# Patient Record
Sex: FEMALE | Race: OTHER | NOT HISPANIC OR LATINO
[De-identification: names, ages, dates, MRNs, and addresses within clinical notes are randomized per-mention and may not be internally consistent; named-entity substitution may affect disease eponyms.]

---

## 2015-03-17 RX ORDER — ATORVASTATIN CALCIUM 80 MG/1
1 TABLET, FILM COATED ORAL
Qty: 0 | Refills: 0 | DISCHARGE
Start: 2015-03-17

## 2015-03-17 RX ORDER — METOPROLOL TARTRATE 50 MG
1 TABLET ORAL
Qty: 0 | Refills: 0 | DISCHARGE
Start: 2015-03-17

## 2017-01-09 ENCOUNTER — APPOINTMENT (OUTPATIENT)
Dept: OTOLARYNGOLOGY | Facility: CLINIC | Age: 62
End: 2017-01-09

## 2017-01-09 ENCOUNTER — OUTPATIENT (OUTPATIENT)
Dept: OUTPATIENT SERVICES | Facility: HOSPITAL | Age: 62
LOS: 1 days | Discharge: HOME | End: 2017-01-09

## 2017-01-09 VITALS
DIASTOLIC BLOOD PRESSURE: 82 MMHG | SYSTOLIC BLOOD PRESSURE: 125 MMHG | BODY MASS INDEX: 37.76 KG/M2 | HEIGHT: 61 IN | WEIGHT: 200 LBS | HEART RATE: 62 BPM

## 2017-01-09 DIAGNOSIS — Z98.51 TUBAL LIGATION STATUS: Chronic | ICD-10-CM

## 2017-01-09 DIAGNOSIS — Z90.710 ACQUIRED ABSENCE OF BOTH CERVIX AND UTERUS: Chronic | ICD-10-CM

## 2017-01-09 DIAGNOSIS — R93.0 ABNORMAL FINDINGS ON DIAGNOSTIC IMAGING OF SKULL AND HEAD, NOT ELSEWHERE CLASSIFIED: ICD-10-CM

## 2017-01-09 DIAGNOSIS — Z98.89 OTHER SPECIFIED POSTPROCEDURAL STATES: Chronic | ICD-10-CM

## 2017-01-09 DIAGNOSIS — R93.8 ABNORMAL FINDINGS ON DIAGNOSTIC IMAGING OF OTHER SPECIFIED BODY STRUCTURES: ICD-10-CM

## 2017-01-09 DIAGNOSIS — J32.0 CHRONIC MAXILLARY SINUSITIS: ICD-10-CM

## 2017-01-13 DIAGNOSIS — R93.0 ABNORMAL FINDINGS ON DIAGNOSTIC IMAGING OF SKULL AND HEAD, NOT ELSEWHERE CLASSIFIED: ICD-10-CM

## 2017-01-14 ENCOUNTER — FORM ENCOUNTER (OUTPATIENT)
Age: 62
End: 2017-01-14

## 2017-01-15 ENCOUNTER — OUTPATIENT (OUTPATIENT)
Dept: OUTPATIENT SERVICES | Facility: HOSPITAL | Age: 62
LOS: 1 days | End: 2017-01-15
Payer: MEDICAID

## 2017-01-15 ENCOUNTER — APPOINTMENT (OUTPATIENT)
Dept: CT IMAGING | Facility: IMAGING CENTER | Age: 62
End: 2017-01-15

## 2017-01-15 DIAGNOSIS — Z98.89 OTHER SPECIFIED POSTPROCEDURAL STATES: Chronic | ICD-10-CM

## 2017-01-15 DIAGNOSIS — Z90.710 ACQUIRED ABSENCE OF BOTH CERVIX AND UTERUS: Chronic | ICD-10-CM

## 2017-01-15 DIAGNOSIS — J32.0 CHRONIC MAXILLARY SINUSITIS: ICD-10-CM

## 2017-01-15 DIAGNOSIS — Z98.51 TUBAL LIGATION STATUS: Chronic | ICD-10-CM

## 2017-01-15 PROCEDURE — 70486 CT MAXILLOFACIAL W/O DYE: CPT

## 2017-01-27 ENCOUNTER — APPOINTMENT (OUTPATIENT)
Dept: INTERNAL MEDICINE | Facility: CLINIC | Age: 62
End: 2017-01-27

## 2017-01-27 ENCOUNTER — OUTPATIENT (OUTPATIENT)
Dept: OUTPATIENT SERVICES | Facility: HOSPITAL | Age: 62
LOS: 1 days | End: 2017-01-27
Payer: MEDICAID

## 2017-01-27 VITALS
HEIGHT: 61 IN | HEART RATE: 84 BPM | SYSTOLIC BLOOD PRESSURE: 130 MMHG | WEIGHT: 208 LBS | DIASTOLIC BLOOD PRESSURE: 80 MMHG | BODY MASS INDEX: 39.27 KG/M2

## 2017-01-27 DIAGNOSIS — Z90.710 ACQUIRED ABSENCE OF BOTH CERVIX AND UTERUS: Chronic | ICD-10-CM

## 2017-01-27 DIAGNOSIS — I10 ESSENTIAL (PRIMARY) HYPERTENSION: ICD-10-CM

## 2017-01-27 DIAGNOSIS — Z98.51 TUBAL LIGATION STATUS: Chronic | ICD-10-CM

## 2017-01-27 DIAGNOSIS — Z98.89 OTHER SPECIFIED POSTPROCEDURAL STATES: Chronic | ICD-10-CM

## 2017-01-27 PROCEDURE — G0463: CPT

## 2017-01-30 DIAGNOSIS — R10.9 UNSPECIFIED ABDOMINAL PAIN: ICD-10-CM

## 2017-07-04 ENCOUNTER — RESULT CHARGE (OUTPATIENT)
Age: 62
End: 2017-07-04

## 2017-07-05 ENCOUNTER — NON-APPOINTMENT (OUTPATIENT)
Age: 62
End: 2017-07-05

## 2017-07-05 ENCOUNTER — OUTPATIENT (OUTPATIENT)
Dept: OUTPATIENT SERVICES | Facility: HOSPITAL | Age: 62
LOS: 1 days | End: 2017-07-05
Payer: MEDICAID

## 2017-07-05 ENCOUNTER — APPOINTMENT (OUTPATIENT)
Dept: INTERNAL MEDICINE | Facility: CLINIC | Age: 62
End: 2017-07-05

## 2017-07-05 VITALS
HEART RATE: 69 BPM | WEIGHT: 212 LBS | SYSTOLIC BLOOD PRESSURE: 140 MMHG | HEIGHT: 61 IN | OXYGEN SATURATION: 98 % | DIASTOLIC BLOOD PRESSURE: 78 MMHG | BODY MASS INDEX: 40.02 KG/M2

## 2017-07-05 VITALS — DIASTOLIC BLOOD PRESSURE: 84 MMHG | SYSTOLIC BLOOD PRESSURE: 130 MMHG

## 2017-07-05 DIAGNOSIS — Z80.41 FAMILY HISTORY OF MALIGNANT NEOPLASM OF OVARY: ICD-10-CM

## 2017-07-05 DIAGNOSIS — R11.0 NAUSEA: ICD-10-CM

## 2017-07-05 DIAGNOSIS — Z98.51 TUBAL LIGATION STATUS: Chronic | ICD-10-CM

## 2017-07-05 DIAGNOSIS — Z12.11 ENCOUNTER FOR SCREENING FOR MALIGNANT NEOPLASM OF COLON: ICD-10-CM

## 2017-07-05 DIAGNOSIS — Z90.710 ACQUIRED ABSENCE OF BOTH CERVIX AND UTERUS: Chronic | ICD-10-CM

## 2017-07-05 DIAGNOSIS — Z87.898 PERSONAL HISTORY OF OTHER SPECIFIED CONDITIONS: ICD-10-CM

## 2017-07-05 DIAGNOSIS — Z87.19 PERSONAL HISTORY OF OTHER DISEASES OF THE DIGESTIVE SYSTEM: ICD-10-CM

## 2017-07-05 DIAGNOSIS — I10 ESSENTIAL (PRIMARY) HYPERTENSION: ICD-10-CM

## 2017-07-05 DIAGNOSIS — Z98.89 OTHER SPECIFIED POSTPROCEDURAL STATES: Chronic | ICD-10-CM

## 2017-07-05 PROCEDURE — G0463: CPT

## 2017-07-05 PROCEDURE — 93005 ELECTROCARDIOGRAM TRACING: CPT

## 2017-07-06 LAB
ALBUMIN SERPL ELPH-MCNC: 4.4 G/DL
ALP BLD-CCNC: 84 U/L
ALT SERPL-CCNC: 18 U/L
ANION GAP SERPL CALC-SCNC: 16 MMOL/L
AST SERPL-CCNC: 26 U/L
BASOPHILS # BLD AUTO: 0.01 K/UL
BASOPHILS NFR BLD AUTO: 0.2 %
BILIRUB SERPL-MCNC: 0.4 MG/DL
BUN SERPL-MCNC: 15 MG/DL
CALCIUM SERPL-MCNC: 9.5 MG/DL
CHLORIDE SERPL-SCNC: 104 MMOL/L
CHOLEST SERPL-MCNC: 259 MG/DL
CHOLEST/HDLC SERPL: 4.2 RATIO
CO2 SERPL-SCNC: 23 MMOL/L
CREAT SERPL-MCNC: 0.92 MG/DL
EOSINOPHIL # BLD AUTO: 0.17 K/UL
EOSINOPHIL NFR BLD AUTO: 3.5 %
GLUCOSE SERPL-MCNC: 98 MG/DL
HBA1C MFR BLD HPLC: 5.8 %
HCT VFR BLD CALC: 41.8 %
HDLC SERPL-MCNC: 61 MG/DL
HGB BLD-MCNC: 13.3 G/DL
IMM GRANULOCYTES NFR BLD AUTO: 0.4 %
LDLC SERPL CALC-MCNC: 165 MG/DL
LYMPHOCYTES # BLD AUTO: 2.03 K/UL
LYMPHOCYTES NFR BLD AUTO: 41.5 %
MAN DIFF?: NORMAL
MCHC RBC-ENTMCNC: 28.7 PG
MCHC RBC-ENTMCNC: 31.8 GM/DL
MCV RBC AUTO: 90.3 FL
MONOCYTES # BLD AUTO: 0.39 K/UL
MONOCYTES NFR BLD AUTO: 8 %
NEUTROPHILS # BLD AUTO: 2.27 K/UL
NEUTROPHILS NFR BLD AUTO: 46.4 %
PLATELET # BLD AUTO: 252 K/UL
POTASSIUM SERPL-SCNC: 5.4 MMOL/L
PROT SERPL-MCNC: 7.9 G/DL
RBC # BLD: 4.63 M/UL
RBC # FLD: 13.3 %
SODIUM SERPL-SCNC: 143 MMOL/L
TRIGL SERPL-MCNC: 165 MG/DL
TSH SERPL-ACNC: 1.5 UIU/ML
WBC # FLD AUTO: 4.89 K/UL

## 2017-07-07 DIAGNOSIS — E66.9 OBESITY, UNSPECIFIED: ICD-10-CM

## 2017-07-07 DIAGNOSIS — E78.5 HYPERLIPIDEMIA, UNSPECIFIED: ICD-10-CM

## 2017-07-07 DIAGNOSIS — Z80.41 FAMILY HISTORY OF MALIGNANT NEOPLASM OF OVARY: ICD-10-CM

## 2017-07-07 DIAGNOSIS — R11.0 NAUSEA: ICD-10-CM

## 2017-07-07 DIAGNOSIS — K21.9 GASTRO-ESOPHAGEAL REFLUX DISEASE WITHOUT ESOPHAGITIS: ICD-10-CM

## 2017-09-13 ENCOUNTER — OUTPATIENT (OUTPATIENT)
Dept: OUTPATIENT SERVICES | Facility: HOSPITAL | Age: 62
LOS: 1 days | End: 2017-09-13
Payer: MEDICAID

## 2017-09-13 ENCOUNTER — APPOINTMENT (OUTPATIENT)
Dept: INTERNAL MEDICINE | Facility: CLINIC | Age: 62
End: 2017-09-13
Payer: MEDICAID

## 2017-09-13 VITALS
SYSTOLIC BLOOD PRESSURE: 110 MMHG | DIASTOLIC BLOOD PRESSURE: 70 MMHG | HEIGHT: 61 IN | WEIGHT: 208 LBS | BODY MASS INDEX: 39.27 KG/M2

## 2017-09-13 DIAGNOSIS — Z98.89 OTHER SPECIFIED POSTPROCEDURAL STATES: Chronic | ICD-10-CM

## 2017-09-13 DIAGNOSIS — Z90.710 ACQUIRED ABSENCE OF BOTH CERVIX AND UTERUS: Chronic | ICD-10-CM

## 2017-09-13 DIAGNOSIS — Z98.51 TUBAL LIGATION STATUS: Chronic | ICD-10-CM

## 2017-09-13 DIAGNOSIS — I10 ESSENTIAL (PRIMARY) HYPERTENSION: ICD-10-CM

## 2017-09-13 PROCEDURE — 90736 HZV VACCINE LIVE SUBQ: CPT

## 2017-09-13 PROCEDURE — 90471 IMMUNIZATION ADMIN: CPT

## 2017-09-13 PROCEDURE — G0463: CPT

## 2017-09-13 PROCEDURE — 99213 OFFICE O/P EST LOW 20 MIN: CPT | Mod: GE

## 2017-09-25 DIAGNOSIS — K21.9 GASTRO-ESOPHAGEAL REFLUX DISEASE WITHOUT ESOPHAGITIS: ICD-10-CM

## 2017-09-25 DIAGNOSIS — Z23 ENCOUNTER FOR IMMUNIZATION: ICD-10-CM

## 2017-09-25 DIAGNOSIS — E78.5 HYPERLIPIDEMIA, UNSPECIFIED: ICD-10-CM

## 2017-09-25 DIAGNOSIS — E66.9 OBESITY, UNSPECIFIED: ICD-10-CM

## 2017-09-25 DIAGNOSIS — Z86.39 PERSONAL HISTORY OF OTHER ENDOCRINE, NUTRITIONAL AND METABOLIC DISEASE: ICD-10-CM

## 2017-10-11 LAB
ANION GAP SERPL CALC-SCNC: 15 MMOL/L
BUN SERPL-MCNC: 20 MG/DL
CALCIUM SERPL-MCNC: 10.4 MG/DL
CHLORIDE SERPL-SCNC: 104 MMOL/L
CO2 SERPL-SCNC: 23 MMOL/L
CREAT SERPL-MCNC: 0.95 MG/DL
GLUCOSE SERPL-MCNC: 101 MG/DL
POTASSIUM SERPL-SCNC: 5 MMOL/L
SODIUM SERPL-SCNC: 142 MMOL/L

## 2017-12-24 ENCOUNTER — OTHER (OUTPATIENT)
Age: 62
End: 2017-12-24

## 2017-12-27 ENCOUNTER — APPOINTMENT (OUTPATIENT)
Dept: INTERNAL MEDICINE | Facility: CLINIC | Age: 62
End: 2017-12-27
Payer: MEDICAID

## 2017-12-27 ENCOUNTER — OUTPATIENT (OUTPATIENT)
Dept: OUTPATIENT SERVICES | Facility: HOSPITAL | Age: 62
LOS: 1 days | End: 2017-12-27
Payer: MEDICAID

## 2017-12-27 ENCOUNTER — NON-APPOINTMENT (OUTPATIENT)
Age: 62
End: 2017-12-27

## 2017-12-27 VITALS — TEMPERATURE: 97.6 F

## 2017-12-27 VITALS — SYSTOLIC BLOOD PRESSURE: 125 MMHG | OXYGEN SATURATION: 98 % | DIASTOLIC BLOOD PRESSURE: 80 MMHG | HEART RATE: 74 BPM

## 2017-12-27 VITALS — WEIGHT: 217 LBS | BODY MASS INDEX: 41 KG/M2

## 2017-12-27 DIAGNOSIS — I10 ESSENTIAL (PRIMARY) HYPERTENSION: ICD-10-CM

## 2017-12-27 DIAGNOSIS — Z90.710 ACQUIRED ABSENCE OF BOTH CERVIX AND UTERUS: Chronic | ICD-10-CM

## 2017-12-27 DIAGNOSIS — Z98.89 OTHER SPECIFIED POSTPROCEDURAL STATES: Chronic | ICD-10-CM

## 2017-12-27 DIAGNOSIS — Z86.39 PERSONAL HISTORY OF OTHER ENDOCRINE, NUTRITIONAL AND METABOLIC DISEASE: ICD-10-CM

## 2017-12-27 DIAGNOSIS — Z98.51 TUBAL LIGATION STATUS: Chronic | ICD-10-CM

## 2017-12-27 LAB — HBA1C MFR BLD HPLC: 5.8 %

## 2017-12-27 PROCEDURE — 99213 OFFICE O/P EST LOW 20 MIN: CPT | Mod: GE

## 2017-12-27 PROCEDURE — 90715 TDAP VACCINE 7 YRS/> IM: CPT

## 2017-12-27 PROCEDURE — G0463: CPT

## 2017-12-27 PROCEDURE — 93005 ELECTROCARDIOGRAM TRACING: CPT

## 2017-12-27 PROCEDURE — 90471 IMMUNIZATION ADMIN: CPT

## 2017-12-27 RX ORDER — FAMOTIDINE 20 MG/1
20 TABLET, FILM COATED ORAL DAILY
Qty: 30 | Refills: 1 | Status: DISCONTINUED | COMMUNITY
Start: 2017-07-05 | End: 2017-12-27

## 2017-12-29 LAB
ANION GAP SERPL CALC-SCNC: 14 MMOL/L
BUN SERPL-MCNC: 17 MG/DL
CALCIUM SERPL-MCNC: 9.8 MG/DL
CHLORIDE SERPL-SCNC: 104 MMOL/L
CO2 SERPL-SCNC: 24 MMOL/L
CREAT SERPL-MCNC: 0.9 MG/DL
GLUCOSE SERPL-MCNC: 90 MG/DL
POTASSIUM SERPL-SCNC: 4.5 MMOL/L
SODIUM SERPL-SCNC: 142 MMOL/L
TSH SERPL-ACNC: 1.66 UIU/ML

## 2018-01-02 DIAGNOSIS — K21.9 GASTRO-ESOPHAGEAL REFLUX DISEASE WITHOUT ESOPHAGITIS: ICD-10-CM

## 2018-01-02 DIAGNOSIS — E78.5 HYPERLIPIDEMIA, UNSPECIFIED: ICD-10-CM

## 2018-01-02 DIAGNOSIS — Z86.39 PERSONAL HISTORY OF OTHER ENDOCRINE, NUTRITIONAL AND METABOLIC DISEASE: ICD-10-CM

## 2018-01-02 DIAGNOSIS — Z23 ENCOUNTER FOR IMMUNIZATION: ICD-10-CM

## 2018-01-02 DIAGNOSIS — E66.9 OBESITY, UNSPECIFIED: ICD-10-CM

## 2018-01-02 DIAGNOSIS — R51 HEADACHE: ICD-10-CM

## 2018-01-02 DIAGNOSIS — R06.02 SHORTNESS OF BREATH: ICD-10-CM

## 2018-01-02 DIAGNOSIS — Z80.41 FAMILY HISTORY OF MALIGNANT NEOPLASM OF OVARY: ICD-10-CM

## 2018-01-04 ENCOUNTER — NON-APPOINTMENT (OUTPATIENT)
Age: 63
End: 2018-01-04

## 2018-02-01 ENCOUNTER — OUTPATIENT (OUTPATIENT)
Dept: OUTPATIENT SERVICES | Facility: HOSPITAL | Age: 63
LOS: 1 days | End: 2018-02-01
Payer: MEDICAID

## 2018-02-01 DIAGNOSIS — Z98.51 TUBAL LIGATION STATUS: Chronic | ICD-10-CM

## 2018-02-01 DIAGNOSIS — Z90.710 ACQUIRED ABSENCE OF BOTH CERVIX AND UTERUS: Chronic | ICD-10-CM

## 2018-02-01 DIAGNOSIS — Z98.89 OTHER SPECIFIED POSTPROCEDURAL STATES: Chronic | ICD-10-CM

## 2018-02-01 PROCEDURE — G9001: CPT

## 2018-02-12 ENCOUNTER — EMERGENCY (EMERGENCY)
Facility: HOSPITAL | Age: 63
LOS: 1 days | Discharge: HOME | End: 2018-02-12
Attending: EMERGENCY MEDICINE
Payer: MEDICAID

## 2018-02-12 VITALS
WEIGHT: 212.08 LBS | SYSTOLIC BLOOD PRESSURE: 132 MMHG | HEART RATE: 105 BPM | HEIGHT: 61 IN | RESPIRATION RATE: 20 BRPM | OXYGEN SATURATION: 96 % | DIASTOLIC BLOOD PRESSURE: 89 MMHG | TEMPERATURE: 100 F

## 2018-02-12 VITALS
OXYGEN SATURATION: 97 % | TEMPERATURE: 98 F | SYSTOLIC BLOOD PRESSURE: 125 MMHG | DIASTOLIC BLOOD PRESSURE: 86 MMHG | RESPIRATION RATE: 16 BRPM | HEART RATE: 68 BPM

## 2018-02-12 DIAGNOSIS — Z98.51 TUBAL LIGATION STATUS: Chronic | ICD-10-CM

## 2018-02-12 DIAGNOSIS — Z90.710 ACQUIRED ABSENCE OF BOTH CERVIX AND UTERUS: Chronic | ICD-10-CM

## 2018-02-12 DIAGNOSIS — Z98.89 OTHER SPECIFIED POSTPROCEDURAL STATES: Chronic | ICD-10-CM

## 2018-02-12 LAB
ALBUMIN SERPL ELPH-MCNC: 3.8 G/DL — SIGNIFICANT CHANGE UP (ref 3.3–5)
ALP SERPL-CCNC: 80 U/L — SIGNIFICANT CHANGE UP (ref 40–120)
ALT FLD-CCNC: 25 U/L RC — SIGNIFICANT CHANGE UP (ref 10–45)
ANION GAP SERPL CALC-SCNC: 15 MMOL/L — SIGNIFICANT CHANGE UP (ref 5–17)
AST SERPL-CCNC: 34 U/L — SIGNIFICANT CHANGE UP (ref 10–40)
BASOPHILS # BLD AUTO: 0 K/UL — SIGNIFICANT CHANGE UP (ref 0–0.2)
BASOPHILS NFR BLD AUTO: 0.3 % — SIGNIFICANT CHANGE UP (ref 0–2)
BILIRUB SERPL-MCNC: 0.2 MG/DL — SIGNIFICANT CHANGE UP (ref 0.2–1.2)
BUN SERPL-MCNC: 11 MG/DL — SIGNIFICANT CHANGE UP (ref 7–23)
CALCIUM SERPL-MCNC: 9 MG/DL — SIGNIFICANT CHANGE UP (ref 8.4–10.5)
CHLORIDE SERPL-SCNC: 103 MMOL/L — SIGNIFICANT CHANGE UP (ref 96–108)
CO2 SERPL-SCNC: 22 MMOL/L — SIGNIFICANT CHANGE UP (ref 22–31)
CREAT SERPL-MCNC: 0.99 MG/DL — SIGNIFICANT CHANGE UP (ref 0.5–1.3)
EOSINOPHIL # BLD AUTO: 0 K/UL — SIGNIFICANT CHANGE UP (ref 0–0.5)
EOSINOPHIL NFR BLD AUTO: 0.4 % — SIGNIFICANT CHANGE UP (ref 0–6)
FLUAV H1 2009 PAND RNA SPEC QL NAA+PROBE: DETECTED
GLUCOSE SERPL-MCNC: 146 MG/DL — HIGH (ref 70–99)
HCT VFR BLD CALC: 43.8 % — SIGNIFICANT CHANGE UP (ref 34.5–45)
HGB BLD-MCNC: 14.4 G/DL — SIGNIFICANT CHANGE UP (ref 11.5–15.5)
LYMPHOCYTES # BLD AUTO: 1.5 K/UL — SIGNIFICANT CHANGE UP (ref 1–3.3)
LYMPHOCYTES # BLD AUTO: 22.3 % — SIGNIFICANT CHANGE UP (ref 13–44)
MCHC RBC-ENTMCNC: 29.7 PG — SIGNIFICANT CHANGE UP (ref 27–34)
MCHC RBC-ENTMCNC: 32.8 GM/DL — SIGNIFICANT CHANGE UP (ref 32–36)
MCV RBC AUTO: 90.7 FL — SIGNIFICANT CHANGE UP (ref 80–100)
MONOCYTES # BLD AUTO: 0.6 K/UL — SIGNIFICANT CHANGE UP (ref 0–0.9)
MONOCYTES NFR BLD AUTO: 9.7 % — SIGNIFICANT CHANGE UP (ref 2–14)
NEUTROPHILS # BLD AUTO: 4.5 K/UL — SIGNIFICANT CHANGE UP (ref 1.8–7.4)
NEUTROPHILS NFR BLD AUTO: 67.3 % — SIGNIFICANT CHANGE UP (ref 43–77)
PLATELET # BLD AUTO: 178 K/UL — SIGNIFICANT CHANGE UP (ref 150–400)
POTASSIUM SERPL-MCNC: 3.8 MMOL/L — SIGNIFICANT CHANGE UP (ref 3.5–5.3)
POTASSIUM SERPL-SCNC: 3.8 MMOL/L — SIGNIFICANT CHANGE UP (ref 3.5–5.3)
PROT SERPL-MCNC: 7.8 G/DL — SIGNIFICANT CHANGE UP (ref 6–8.3)
RAPID RVP RESULT: DETECTED
RBC # BLD: 4.83 M/UL — SIGNIFICANT CHANGE UP (ref 3.8–5.2)
RBC # FLD: 11.7 % — SIGNIFICANT CHANGE UP (ref 10.3–14.5)
SODIUM SERPL-SCNC: 140 MMOL/L — SIGNIFICANT CHANGE UP (ref 135–145)
WBC # BLD: 6.6 K/UL — SIGNIFICANT CHANGE UP (ref 3.8–10.5)
WBC # FLD AUTO: 6.6 K/UL — SIGNIFICANT CHANGE UP (ref 3.8–10.5)

## 2018-02-12 PROCEDURE — 99284 EMERGENCY DEPT VISIT MOD MDM: CPT | Mod: 25

## 2018-02-12 PROCEDURE — 87581 M.PNEUMON DNA AMP PROBE: CPT

## 2018-02-12 PROCEDURE — 80053 COMPREHEN METABOLIC PANEL: CPT

## 2018-02-12 PROCEDURE — 85027 COMPLETE CBC AUTOMATED: CPT

## 2018-02-12 PROCEDURE — 87633 RESP VIRUS 12-25 TARGETS: CPT

## 2018-02-12 PROCEDURE — 71046 X-RAY EXAM CHEST 2 VIEWS: CPT

## 2018-02-12 PROCEDURE — 87798 DETECT AGENT NOS DNA AMP: CPT

## 2018-02-12 PROCEDURE — 71046 X-RAY EXAM CHEST 2 VIEWS: CPT | Mod: 26

## 2018-02-12 PROCEDURE — 96374 THER/PROPH/DIAG INJ IV PUSH: CPT

## 2018-02-12 PROCEDURE — 87486 CHLMYD PNEUM DNA AMP PROBE: CPT

## 2018-02-12 PROCEDURE — 99284 EMERGENCY DEPT VISIT MOD MDM: CPT

## 2018-02-12 RX ORDER — ACETAMINOPHEN 500 MG
1000 TABLET ORAL ONCE
Qty: 0 | Refills: 0 | Status: COMPLETED | OUTPATIENT
Start: 2018-02-12 | End: 2018-02-12

## 2018-02-12 RX ORDER — SODIUM CHLORIDE 9 MG/ML
1000 INJECTION INTRAMUSCULAR; INTRAVENOUS; SUBCUTANEOUS ONCE
Qty: 0 | Refills: 0 | Status: COMPLETED | OUTPATIENT
Start: 2018-02-12 | End: 2018-02-12

## 2018-02-12 RX ADMIN — Medication 400 MILLIGRAM(S): at 15:41

## 2018-02-12 RX ADMIN — Medication 75 MILLIGRAM(S): at 17:40

## 2018-02-12 RX ADMIN — SODIUM CHLORIDE 2000 MILLILITER(S): 9 INJECTION INTRAMUSCULAR; INTRAVENOUS; SUBCUTANEOUS at 15:41

## 2018-02-12 NOTE — ED PROVIDER NOTE - MEDICAL DECISION MAKING DETAILS
****ATTENDING**** 63yo f hx listed pw cough and fever x 3 days. Pts VS stable. Check Labs, RVP, Xray chest. Likely viral syndrome. IVF and antipyretics and re eval.

## 2018-02-12 NOTE — ED PROVIDER NOTE - PLAN OF CARE
1. Follow up with PMD or [facility] at [phone] in 24-48hrs.  2. Hydrate with plenty of fluids.  3. Tylenol or Motrin as directed for fever.  4. If patient develops worsening symptoms, develops chest pain, shortness of breath, or any other concern return to the ER. Please follow up with your Primary MD in 24-48 hr.  Tamiflu 75mg 1 tab by mouth twice a day.  Continue hydration with fluids.  For fever, tylenol or ibuprofen as directed.  Seek immediate medical care for any new/worsening signs or symptoms including chest pain, shortness of breath, persistent fever or any other concern.

## 2018-02-12 NOTE — ED PROVIDER NOTE - OBJECTIVE STATEMENT
61 yo f with pmhx of hdl presents with cough, congestion, and fever that started Saturday afternoon. Her fever increased from 99.9 on Saturday to 101.5 today. Pt also reports some sob, but denies chest pain, abdominal pain, diarrhea. No sick contacts or recent travel. 63 yo f with pmhx of HLD presents with cough, congestion, and fever that started Saturday afternoon. cough is non productive. Her fever increased from 99.9 on Saturday to 101.5 today. Pt denies chest pain, SOB, abdominal pain, diarrhea. No sick contacts or recent travel.

## 2018-02-12 NOTE — ED PROVIDER NOTE - PROGRESS NOTE DETAILS
Patient is reassessed, states feeling much better at this time. Discussed results and return precautions. [id number]

## 2018-02-12 NOTE — ED ADULT NURSE NOTE - OBJECTIVE STATEMENT
63 y/o female no PMH presents to the ED c/o cough fever tmax 100F x 2 days. Pt presents to ED states she "thinks I have the flu," body aches, chills. Denies productive cough, n/v, diarrhea, constipation. Pt A&Ox3, in no respiratory distress, no CP, lungs CTA b/l, no accessory muscle use. Pt safety maintained with daughter at bedside.

## 2018-02-12 NOTE — ED PROVIDER NOTE - CARE PLAN
Assessment and plan of treatment:	1. Follow up with PMD or [facility] at [phone] in 24-48hrs.  2. Hydrate with plenty of fluids.  3. Tylenol or Motrin as directed for fever.  4. If patient develops worsening symptoms, develops chest pain, shortness of breath, or any other concern return to the ER. Principal Discharge DX:	Influenza A  Assessment and plan of treatment:	Please follow up with your Primary MD in 24-48 hr.  Tamiflu 75mg 1 tab by mouth twice a day.  Continue hydration with fluids.  For fever, tylenol or ibuprofen as directed.  Seek immediate medical care for any new/worsening signs or symptoms including chest pain, shortness of breath, persistent fever or any other concern.

## 2018-02-15 DIAGNOSIS — R69 ILLNESS, UNSPECIFIED: ICD-10-CM

## 2018-03-21 ENCOUNTER — MESSAGE (OUTPATIENT)
Age: 63
End: 2018-03-21

## 2018-04-08 ENCOUNTER — FORM ENCOUNTER (OUTPATIENT)
Age: 63
End: 2018-04-08

## 2018-04-09 ENCOUNTER — OUTPATIENT (OUTPATIENT)
Dept: OUTPATIENT SERVICES | Facility: HOSPITAL | Age: 63
LOS: 1 days | End: 2018-04-09
Payer: MEDICAID

## 2018-04-09 ENCOUNTER — APPOINTMENT (OUTPATIENT)
Dept: INTERNAL MEDICINE | Facility: CLINIC | Age: 63
End: 2018-04-09
Payer: MEDICAID

## 2018-04-09 ENCOUNTER — APPOINTMENT (OUTPATIENT)
Dept: MAMMOGRAPHY | Facility: IMAGING CENTER | Age: 63
End: 2018-04-09
Payer: MEDICAID

## 2018-04-09 VITALS
DIASTOLIC BLOOD PRESSURE: 80 MMHG | SYSTOLIC BLOOD PRESSURE: 119 MMHG | BODY MASS INDEX: 39.84 KG/M2 | HEIGHT: 61 IN | WEIGHT: 211 LBS

## 2018-04-09 DIAGNOSIS — K21.9 GASTRO-ESOPHAGEAL REFLUX DISEASE WITHOUT ESOPHAGITIS: ICD-10-CM

## 2018-04-09 DIAGNOSIS — R93.0 ABNORMAL FINDINGS ON DIAGNOSTIC IMAGING OF SKULL AND HEAD, NOT ELSEWHERE CLASSIFIED: ICD-10-CM

## 2018-04-09 DIAGNOSIS — Z86.39 PERSONAL HISTORY OF OTHER ENDOCRINE, NUTRITIONAL AND METABOLIC DISEASE: ICD-10-CM

## 2018-04-09 DIAGNOSIS — E78.5 HYPERLIPIDEMIA, UNSPECIFIED: ICD-10-CM

## 2018-04-09 DIAGNOSIS — R11.0 NAUSEA: ICD-10-CM

## 2018-04-09 DIAGNOSIS — Z98.51 TUBAL LIGATION STATUS: Chronic | ICD-10-CM

## 2018-04-09 DIAGNOSIS — Z98.89 OTHER SPECIFIED POSTPROCEDURAL STATES: Chronic | ICD-10-CM

## 2018-04-09 DIAGNOSIS — Z90.710 ACQUIRED ABSENCE OF BOTH CERVIX AND UTERUS: Chronic | ICD-10-CM

## 2018-04-09 DIAGNOSIS — Z00.8 ENCOUNTER FOR OTHER GENERAL EXAMINATION: ICD-10-CM

## 2018-04-09 DIAGNOSIS — R51 HEADACHE: ICD-10-CM

## 2018-04-09 DIAGNOSIS — R06.02 SHORTNESS OF BREATH: ICD-10-CM

## 2018-04-09 DIAGNOSIS — R93.8 ABNORMAL FINDINGS ON DIAGNOSTIC IMAGING OF OTHER SPECIFIED BODY STRUCTURES: ICD-10-CM

## 2018-04-09 DIAGNOSIS — I10 ESSENTIAL (PRIMARY) HYPERTENSION: ICD-10-CM

## 2018-04-09 DIAGNOSIS — E66.9 OBESITY, UNSPECIFIED: ICD-10-CM

## 2018-04-09 DIAGNOSIS — J32.0 CHRONIC MAXILLARY SINUSITIS: ICD-10-CM

## 2018-04-09 PROCEDURE — 99213 OFFICE O/P EST LOW 20 MIN: CPT | Mod: GE

## 2018-04-09 PROCEDURE — G0463: CPT

## 2018-04-09 PROCEDURE — 77063 BREAST TOMOSYNTHESIS BI: CPT

## 2018-04-09 PROCEDURE — 77067 SCR MAMMO BI INCL CAD: CPT

## 2018-04-09 PROCEDURE — 77067 SCR MAMMO BI INCL CAD: CPT | Mod: 26

## 2018-04-09 PROCEDURE — 77063 BREAST TOMOSYNTHESIS BI: CPT | Mod: 26

## 2018-04-09 RX ORDER — FLUTICASONE PROPIONATE 50 UG/1
50 SPRAY, METERED NASAL TWICE DAILY
Qty: 1 | Refills: 3 | Status: DISCONTINUED | COMMUNITY
Start: 2017-12-27 | End: 2018-04-09

## 2018-04-09 RX ORDER — BUTALBITAL, ACETAMINOPHEN, AND CAFFEINE 50; 300; 40 MG/1; MG/1; MG/1
50-300-40 CAPSULE ORAL EVERY 6 HOURS
Qty: 1 | Refills: 0 | Status: DISCONTINUED | COMMUNITY
Start: 2017-12-27 | End: 2018-04-09

## 2018-04-13 ENCOUNTER — APPOINTMENT (OUTPATIENT)
Dept: INTERNAL MEDICINE | Facility: CLINIC | Age: 63
End: 2018-04-13

## 2018-06-12 ENCOUNTER — APPOINTMENT (OUTPATIENT)
Dept: INTERNAL MEDICINE | Facility: CLINIC | Age: 63
End: 2018-06-12
Payer: MEDICAID

## 2018-06-12 ENCOUNTER — OUTPATIENT (OUTPATIENT)
Dept: OUTPATIENT SERVICES | Facility: HOSPITAL | Age: 63
LOS: 1 days | End: 2018-06-12
Payer: MEDICAID

## 2018-06-12 VITALS
BODY MASS INDEX: 40.78 KG/M2 | HEIGHT: 61 IN | SYSTOLIC BLOOD PRESSURE: 130 MMHG | WEIGHT: 216 LBS | DIASTOLIC BLOOD PRESSURE: 90 MMHG

## 2018-06-12 DIAGNOSIS — Z90.710 ACQUIRED ABSENCE OF BOTH CERVIX AND UTERUS: Chronic | ICD-10-CM

## 2018-06-12 DIAGNOSIS — Z98.89 OTHER SPECIFIED POSTPROCEDURAL STATES: Chronic | ICD-10-CM

## 2018-06-12 DIAGNOSIS — I10 ESSENTIAL (PRIMARY) HYPERTENSION: ICD-10-CM

## 2018-06-12 DIAGNOSIS — Z98.51 TUBAL LIGATION STATUS: Chronic | ICD-10-CM

## 2018-06-12 PROCEDURE — G0463: CPT

## 2018-06-12 PROCEDURE — 99213 OFFICE O/P EST LOW 20 MIN: CPT | Mod: GE

## 2018-06-14 NOTE — ASSESSMENT
[FreeTextEntry1] : 61 yo Holiness F with a PMH GERD, HLD, and chronic sinusitis who presents for f/u appt, c/o back pain.\par \par 1) Back pain\par - pt presenting again with back pain, failed conservative management with motrin. Pt unable to lose weight as pain is too severe. Will give pt naproxen and flexeril as needed as well as physical therapy. Pt advised to RTC in 5 weeks. Will consider MRI and ortho consult if pain not improved at that time.\par \par case d/w  Dr. Sykes

## 2018-06-14 NOTE — HISTORY OF PRESENT ILLNESS
[de-identified] : 63 yo Quaker F with a PMH GERD, HLD, and chronic sinusitis who presents for f/u appt for back pain.\par \par Back pain- pt last seen in April for back pain. Pt states has been persistent since then and last two days has worsened and now is unbearable. Pain mostly on lower left side with radiation down left leg. Denies weakness or numbness/tingling. No trauma or surgeries. Originally started in April after bought of the flu. Pt states motrin helps a little but then comes back. Worse with standing and walking. Able to lie down in certain positions. Last visit pt recommended weight loss, but pain is limiting her physical activity.

## 2018-06-14 NOTE — PHYSICAL EXAM
[No Acute Distress] : no acute distress [Well Nourished] : well nourished [Normal Sclera/Conjunctiva] : normal sclera/conjunctiva [PERRL] : pupils equal round and reactive to light [EOMI] : extraocular movements intact [Normal Outer Ear/Nose] : the outer ears and nose were normal in appearance [Normal Oropharynx] : the oropharynx was normal [No JVD] : no jugular venous distention [Supple] : supple [No Respiratory Distress] : no respiratory distress  [Clear to Auscultation] : lungs were clear to auscultation bilaterally [Normal Rate] : normal rate  [Regular Rhythm] : with a regular rhythm [Normal S1, S2] : normal S1 and S2 [No Murmur] : no murmur heard [No Edema] : there was no peripheral edema [Soft] : abdomen soft [Non Tender] : non-tender [No HSM] : no HSM [Normal Bowel Sounds] : normal bowel sounds [Normal Supraclavicular Nodes] : no supraclavicular lymphadenopathy [Normal Posterior Cervical Nodes] : no posterior cervical lymphadenopathy [Normal Anterior Cervical Nodes] : no anterior cervical lymphadenopathy [No CVA Tenderness] : no CVA  tenderness [No Joint Swelling] : no joint swelling [Grossly Normal Strength/Tone] : grossly normal strength/tone [No Rash] : no rash [No Skin Lesions] : no skin lesions [Normal Gait] : normal gait [Coordination Grossly Intact] : coordination grossly intact [No Focal Deficits] : no focal deficits [Alert and Oriented x3] : oriented to person, place, and time [Normal Mood] : the mood was normal [Normal Insight/Judgement] : insight and judgment were intact [de-identified] : obese [de-identified] : L paraspinal tenderness and spasm at L3-4, minimal spinal tenderness. Straight leg raise test negative.  [de-identified] : strenght 5/5 all extremiteis [de-identified] : sensation grossly normal

## 2018-06-14 NOTE — REVIEW OF SYSTEMS
[Heartburn] : heartburn [Back Pain] : back pain [Fever] : no fever [Chills] : no chills [Fatigue] : no fatigue [Night Sweats] : no night sweats [Pain] : no pain [Vision Problems] : no vision problems [Earache] : no earache [Hearing Loss] : no hearing loss [Sore Throat] : no sore throat [Chest Pain] : no chest pain [Palpitations] : no palpitations [Lower Ext Edema] : no lower extremity edema [Orthopnea] : no orthopnea [Shortness Of Breath] : no shortness of breath [Wheezing] : no wheezing [Cough] : no cough [Dyspnea on Exertion] : no dyspnea on exertion [Abdominal Pain] : no abdominal pain [Nausea] : no nausea [Constipation] : no constipation [Diarrhea] : diarrhea [Vomiting] : no vomiting [Dysuria] : no dysuria [Incontinence] : no incontinence [Hematuria] : no hematuria [Joint Pain] : no joint pain [Muscle Pain] : no muscle pain [Itching] : no itching [Skin Rash] : no skin rash [Headache] : no headache [Dizziness] : no dizziness [Anxiety] : no anxiety [Depression] : no depression [Easy Bleeding] : no easy bleeding [Swollen Glands] : no swollen glands

## 2018-06-19 DIAGNOSIS — M54.9 DORSALGIA, UNSPECIFIED: ICD-10-CM

## 2019-07-17 PROBLEM — E78.00 PURE HYPERCHOLESTEROLEMIA, UNSPECIFIED: Chronic | Status: ACTIVE | Noted: 2018-02-12

## 2019-08-21 ENCOUNTER — APPOINTMENT (OUTPATIENT)
Dept: INTERNAL MEDICINE | Facility: CLINIC | Age: 64
End: 2019-08-21

## 2019-10-23 ENCOUNTER — LABORATORY RESULT (OUTPATIENT)
Age: 64
End: 2019-10-23

## 2019-10-23 ENCOUNTER — OUTPATIENT (OUTPATIENT)
Dept: OUTPATIENT SERVICES | Facility: HOSPITAL | Age: 64
LOS: 1 days | End: 2019-10-23
Payer: MEDICAID

## 2019-10-23 ENCOUNTER — APPOINTMENT (OUTPATIENT)
Dept: INTERNAL MEDICINE | Facility: CLINIC | Age: 64
End: 2019-10-23
Payer: MEDICAID

## 2019-10-23 ENCOUNTER — NON-APPOINTMENT (OUTPATIENT)
Age: 64
End: 2019-10-23

## 2019-10-23 VITALS
BODY MASS INDEX: 40.02 KG/M2 | DIASTOLIC BLOOD PRESSURE: 80 MMHG | SYSTOLIC BLOOD PRESSURE: 131 MMHG | HEIGHT: 61 IN | WEIGHT: 212 LBS

## 2019-10-23 DIAGNOSIS — R09.82 POSTNASAL DRIP: ICD-10-CM

## 2019-10-23 DIAGNOSIS — R22.31 LOCALIZED SWELLING, MASS AND LUMP, RIGHT UPPER LIMB: ICD-10-CM

## 2019-10-23 DIAGNOSIS — E66.9 OBESITY, UNSPECIFIED: ICD-10-CM

## 2019-10-23 DIAGNOSIS — Z90.710 ACQUIRED ABSENCE OF BOTH CERVIX AND UTERUS: Chronic | ICD-10-CM

## 2019-10-23 DIAGNOSIS — I10 ESSENTIAL (PRIMARY) HYPERTENSION: ICD-10-CM

## 2019-10-23 DIAGNOSIS — Z98.51 TUBAL LIGATION STATUS: Chronic | ICD-10-CM

## 2019-10-23 DIAGNOSIS — R06.02 SHORTNESS OF BREATH: ICD-10-CM

## 2019-10-23 DIAGNOSIS — I20.8 OTHER FORMS OF ANGINA PECTORIS: ICD-10-CM

## 2019-10-23 DIAGNOSIS — M54.9 DORSALGIA, UNSPECIFIED: ICD-10-CM

## 2019-10-23 DIAGNOSIS — Z82.49 FAMILY HISTORY OF ISCHEMIC HEART DISEASE AND OTHER DISEASES OF THE CIRCULATORY SYSTEM: ICD-10-CM

## 2019-10-23 DIAGNOSIS — Z98.89 OTHER SPECIFIED POSTPROCEDURAL STATES: Chronic | ICD-10-CM

## 2019-10-23 PROCEDURE — 90688 IIV4 VACCINE SPLT 0.5 ML IM: CPT

## 2019-10-23 PROCEDURE — 80053 COMPREHEN METABOLIC PANEL: CPT

## 2019-10-23 PROCEDURE — 86803 HEPATITIS C AB TEST: CPT

## 2019-10-23 PROCEDURE — 84443 ASSAY THYROID STIM HORMONE: CPT

## 2019-10-23 PROCEDURE — G0008: CPT

## 2019-10-23 PROCEDURE — 83036 HEMOGLOBIN GLYCOSYLATED A1C: CPT

## 2019-10-23 PROCEDURE — 85027 COMPLETE CBC AUTOMATED: CPT

## 2019-10-23 PROCEDURE — 80061 LIPID PANEL: CPT

## 2019-10-23 PROCEDURE — G0463: CPT | Mod: 25

## 2019-10-23 PROCEDURE — 99214 OFFICE O/P EST MOD 30 MIN: CPT | Mod: GC

## 2019-10-23 RX ORDER — CYCLOBENZAPRINE HYDROCHLORIDE 10 MG/1
10 TABLET, FILM COATED ORAL 3 TIMES DAILY
Qty: 30 | Refills: 0 | Status: DISCONTINUED | COMMUNITY
Start: 2018-06-12 | End: 2019-10-23

## 2019-10-24 DIAGNOSIS — R06.02 SHORTNESS OF BREATH: ICD-10-CM

## 2019-10-24 DIAGNOSIS — Z82.49 FAMILY HISTORY OF ISCHEMIC HEART DISEASE AND OTHER DISEASES OF THE CIRCULATORY SYSTEM: ICD-10-CM

## 2019-10-24 DIAGNOSIS — Z23 ENCOUNTER FOR IMMUNIZATION: ICD-10-CM

## 2019-10-24 DIAGNOSIS — M54.9 DORSALGIA, UNSPECIFIED: ICD-10-CM

## 2019-10-24 DIAGNOSIS — K21.9 GASTRO-ESOPHAGEAL REFLUX DISEASE WITHOUT ESOPHAGITIS: ICD-10-CM

## 2019-10-24 DIAGNOSIS — I20.8 OTHER FORMS OF ANGINA PECTORIS: ICD-10-CM

## 2019-10-24 DIAGNOSIS — Z12.39 ENCOUNTER FOR OTHER SCREENING FOR MALIGNANT NEOPLASM OF BREAST: ICD-10-CM

## 2019-10-24 DIAGNOSIS — E66.01 MORBID (SEVERE) OBESITY DUE TO EXCESS CALORIES: ICD-10-CM

## 2019-10-24 LAB
ALBUMIN SERPL ELPH-MCNC: 4.4 G/DL — SIGNIFICANT CHANGE UP (ref 3.3–5)
ALP SERPL-CCNC: 88 U/L — SIGNIFICANT CHANGE UP (ref 40–120)
ALT FLD-CCNC: 14 U/L — SIGNIFICANT CHANGE UP (ref 10–45)
ANION GAP SERPL CALC-SCNC: 14 MMOL/L — SIGNIFICANT CHANGE UP (ref 5–17)
AST SERPL-CCNC: 24 U/L — SIGNIFICANT CHANGE UP (ref 10–40)
BILIRUB SERPL-MCNC: 0.3 MG/DL — SIGNIFICANT CHANGE UP (ref 0.2–1.2)
BUN SERPL-MCNC: 15 MG/DL — SIGNIFICANT CHANGE UP (ref 7–23)
CALCIUM SERPL-MCNC: 10.2 MG/DL — SIGNIFICANT CHANGE UP (ref 8.4–10.5)
CHLORIDE SERPL-SCNC: 106 MMOL/L — SIGNIFICANT CHANGE UP (ref 96–108)
CHOLEST SERPL-MCNC: 251 MG/DL — HIGH (ref 10–199)
CO2 SERPL-SCNC: 25 MMOL/L — SIGNIFICANT CHANGE UP (ref 22–31)
CREAT SERPL-MCNC: 0.95 MG/DL — SIGNIFICANT CHANGE UP (ref 0.5–1.3)
ESTIMATED AVERAGE GLUCOSE: 126 MG/DL — HIGH (ref 68–114)
GLUCOSE SERPL-MCNC: 104 MG/DL — HIGH (ref 70–99)
HBA1C BLD-MCNC: 6 % — HIGH (ref 4–5.6)
HCT VFR BLD CALC: 45.6 % — HIGH (ref 34.5–45)
HCV AB S/CO SERPL IA: 0.36 S/CO — SIGNIFICANT CHANGE UP (ref 0–0.99)
HCV AB SERPL-IMP: SIGNIFICANT CHANGE UP
HDLC SERPL-MCNC: 53 MG/DL — SIGNIFICANT CHANGE UP
HGB BLD-MCNC: 13.8 G/DL — SIGNIFICANT CHANGE UP (ref 11.5–15.5)
LIPID PNL WITH DIRECT LDL SERPL: 170 MG/DL — HIGH
MCHC RBC-ENTMCNC: 28.7 PG — SIGNIFICANT CHANGE UP (ref 27–34)
MCHC RBC-ENTMCNC: 30.3 GM/DL — LOW (ref 32–36)
MCV RBC AUTO: 94.8 FL — SIGNIFICANT CHANGE UP (ref 80–100)
PLATELET # BLD AUTO: 240 K/UL — SIGNIFICANT CHANGE UP (ref 150–400)
POTASSIUM SERPL-MCNC: 4.6 MMOL/L — SIGNIFICANT CHANGE UP (ref 3.5–5.3)
POTASSIUM SERPL-SCNC: 4.6 MMOL/L — SIGNIFICANT CHANGE UP (ref 3.5–5.3)
PROT SERPL-MCNC: 7.7 G/DL — SIGNIFICANT CHANGE UP (ref 6–8.3)
RBC # BLD: 4.81 M/UL — SIGNIFICANT CHANGE UP (ref 3.8–5.2)
RBC # FLD: 12.8 % — SIGNIFICANT CHANGE UP (ref 10.3–14.5)
SODIUM SERPL-SCNC: 145 MMOL/L — SIGNIFICANT CHANGE UP (ref 135–145)
TOTAL CHOLESTEROL/HDL RATIO MEASUREMENT: 4.7 RATIO — SIGNIFICANT CHANGE UP (ref 3.3–7.1)
TRIGL SERPL-MCNC: 141 MG/DL — SIGNIFICANT CHANGE UP (ref 10–149)
TSH SERPL-MCNC: 0.85 UIU/ML — SIGNIFICANT CHANGE UP (ref 0.27–4.2)
WBC # BLD: 5.85 K/UL — SIGNIFICANT CHANGE UP (ref 3.8–10.5)
WBC # FLD AUTO: 5.85 K/UL — SIGNIFICANT CHANGE UP (ref 3.8–10.5)

## 2019-11-13 ENCOUNTER — APPOINTMENT (OUTPATIENT)
Dept: INTERNAL MEDICINE | Facility: CLINIC | Age: 64
End: 2019-11-13

## 2019-12-03 ENCOUNTER — FORM ENCOUNTER (OUTPATIENT)
Age: 64
End: 2019-12-03

## 2019-12-04 ENCOUNTER — APPOINTMENT (OUTPATIENT)
Dept: MAMMOGRAPHY | Facility: IMAGING CENTER | Age: 64
End: 2019-12-04
Payer: MEDICAID

## 2019-12-04 ENCOUNTER — OUTPATIENT (OUTPATIENT)
Dept: OUTPATIENT SERVICES | Facility: HOSPITAL | Age: 64
LOS: 1 days | End: 2019-12-04
Payer: MEDICAID

## 2019-12-04 DIAGNOSIS — Z90.710 ACQUIRED ABSENCE OF BOTH CERVIX AND UTERUS: Chronic | ICD-10-CM

## 2019-12-04 DIAGNOSIS — Z98.51 TUBAL LIGATION STATUS: Chronic | ICD-10-CM

## 2019-12-04 DIAGNOSIS — Z12.39 ENCOUNTER FOR OTHER SCREENING FOR MALIGNANT NEOPLASM OF BREAST: ICD-10-CM

## 2019-12-04 DIAGNOSIS — Z98.89 OTHER SPECIFIED POSTPROCEDURAL STATES: Chronic | ICD-10-CM

## 2019-12-04 PROCEDURE — 77067 SCR MAMMO BI INCL CAD: CPT | Mod: 26

## 2019-12-04 PROCEDURE — 77067 SCR MAMMO BI INCL CAD: CPT

## 2019-12-04 PROCEDURE — 77063 BREAST TOMOSYNTHESIS BI: CPT | Mod: 26

## 2019-12-04 PROCEDURE — 77063 BREAST TOMOSYNTHESIS BI: CPT

## 2019-12-09 ENCOUNTER — OUTPATIENT (OUTPATIENT)
Dept: OUTPATIENT SERVICES | Facility: HOSPITAL | Age: 64
LOS: 1 days | End: 2019-12-09
Payer: MEDICAID

## 2019-12-09 ENCOUNTER — APPOINTMENT (OUTPATIENT)
Dept: CV DIAGNOSTICS | Facility: HOSPITAL | Age: 64
End: 2019-12-09

## 2019-12-09 DIAGNOSIS — I20.8 OTHER FORMS OF ANGINA PECTORIS: ICD-10-CM

## 2019-12-09 DIAGNOSIS — Z98.89 OTHER SPECIFIED POSTPROCEDURAL STATES: Chronic | ICD-10-CM

## 2019-12-09 DIAGNOSIS — Z90.710 ACQUIRED ABSENCE OF BOTH CERVIX AND UTERUS: Chronic | ICD-10-CM

## 2019-12-09 DIAGNOSIS — Z98.51 TUBAL LIGATION STATUS: Chronic | ICD-10-CM

## 2019-12-09 PROCEDURE — 93017 CV STRESS TEST TRACING ONLY: CPT

## 2019-12-09 PROCEDURE — 93018 CV STRESS TEST I&R ONLY: CPT

## 2019-12-09 PROCEDURE — 78452 HT MUSCLE IMAGE SPECT MULT: CPT | Mod: 26

## 2019-12-09 PROCEDURE — A9500: CPT

## 2019-12-09 PROCEDURE — 78452 HT MUSCLE IMAGE SPECT MULT: CPT

## 2019-12-09 PROCEDURE — 93016 CV STRESS TEST SUPVJ ONLY: CPT

## 2020-01-15 ENCOUNTER — OUTPATIENT (OUTPATIENT)
Dept: OUTPATIENT SERVICES | Facility: HOSPITAL | Age: 65
LOS: 1 days | End: 2020-01-15
Payer: MEDICAID

## 2020-01-15 ENCOUNTER — APPOINTMENT (OUTPATIENT)
Dept: INTERNAL MEDICINE | Facility: CLINIC | Age: 65
End: 2020-01-15
Payer: MEDICAID

## 2020-01-15 VITALS
DIASTOLIC BLOOD PRESSURE: 80 MMHG | BODY MASS INDEX: 40.02 KG/M2 | WEIGHT: 212 LBS | SYSTOLIC BLOOD PRESSURE: 132 MMHG | HEIGHT: 61 IN

## 2020-01-15 DIAGNOSIS — Z98.89 OTHER SPECIFIED POSTPROCEDURAL STATES: Chronic | ICD-10-CM

## 2020-01-15 DIAGNOSIS — I10 ESSENTIAL (PRIMARY) HYPERTENSION: ICD-10-CM

## 2020-01-15 DIAGNOSIS — Z98.51 TUBAL LIGATION STATUS: Chronic | ICD-10-CM

## 2020-01-15 DIAGNOSIS — Z90.710 ACQUIRED ABSENCE OF BOTH CERVIX AND UTERUS: Chronic | ICD-10-CM

## 2020-01-15 PROBLEM — R22.31 MASS OF RIGHT UPPER EXTREMITY: Status: ACTIVE | Noted: 2020-01-15

## 2020-01-15 PROBLEM — R09.82 POST-NASAL DRIP: Status: ACTIVE | Noted: 2020-01-15

## 2020-01-15 PROCEDURE — 99213 OFFICE O/P EST LOW 20 MIN: CPT | Mod: GE

## 2020-01-15 PROCEDURE — G0463: CPT

## 2020-01-15 RX ORDER — LOSARTAN POTASSIUM 25 MG/1
25 TABLET, FILM COATED ORAL DAILY
Qty: 1 | Refills: 0 | Status: DISCONTINUED | COMMUNITY
Start: 2019-10-23 | End: 2020-01-15

## 2020-01-15 NOTE — REVIEW OF SYSTEMS
[Back Pain] : back pain [Headache] : headache [Pain] : no pain [Hearing Loss] : no hearing loss [Hoarseness] : no hoarseness [Diarrhea] : diarrhea [Melena] : no melena [Frequency] : no frequency [Joint Pain] : no joint pain [Dizziness] : no dizziness [Depression] : no depression [Nasal Discharge] : nasal discharge [Postnasal Drip] : postnasal drip [Shortness Of Breath] : shortness of breath [Dyspnea on Exertion] : dyspnea on exertion [Cough] : cough [Fever] : no fever [Chills] : no chills [Fatigue] : no fatigue [Vision Problems] : no vision problems [Recent Change In Weight] : ~T no recent weight change [Lower Ext Edema] : no lower extremity edema [Palpitations] : no palpitations [Chest Pain] : no chest pain [Paroxysmal Nocturnal Dyspnea] : no paroxysmal nocturnal dyspnea [Orthopnea] : no orthopnea [Abdominal Pain] : no abdominal pain [Wheezing] : no wheezing [Nausea] : no nausea [Constipation] : no constipation [Dysuria] : no dysuria [Vomiting] : no vomiting [Heartburn] : no heartburn [Hematuria] : no hematuria

## 2020-01-15 NOTE — HISTORY OF PRESENT ILLNESS
[FreeTextEntry1] : 65 yo Faith F with a PMH GERD, HLD, and chronic sinusitis who presents for a CPE.   [de-identified] : 65 yo Congregation F with a PMH GERD, HLD, and chronic sinusitis who presents to review her stress test.  Pt endorsed dyspnea w/ exertion during last visit\par

## 2020-01-15 NOTE — REVIEW OF SYSTEMS
[Back Pain] : back pain [Headache] : headache [Pain] : no pain [Hearing Loss] : no hearing loss [Hoarseness] : no hoarseness [Diarrhea] : diarrhea [Melena] : no melena [Frequency] : no frequency [Dizziness] : no dizziness [Joint Pain] : no joint pain [Depression] : no depression [Nasal Discharge] : nasal discharge [Postnasal Drip] : postnasal drip [Shortness Of Breath] : shortness of breath [Cough] : cough [Dyspnea on Exertion] : dyspnea on exertion [Fever] : no fever [Chills] : no chills [Fatigue] : no fatigue [Vision Problems] : no vision problems [Recent Change In Weight] : ~T no recent weight change [Palpitations] : no palpitations [Chest Pain] : no chest pain [Lower Ext Edema] : no lower extremity edema [Orthopnea] : no orthopnea [Paroxysmal Nocturnal Dyspnea] : no paroxysmal nocturnal dyspnea [Wheezing] : no wheezing [Abdominal Pain] : no abdominal pain [Nausea] : no nausea [Constipation] : no constipation [Heartburn] : no heartburn [Dysuria] : no dysuria [Vomiting] : no vomiting [Hematuria] : no hematuria

## 2020-01-15 NOTE — PHYSICAL EXAM
[Well Nourished] : well nourished [Well-Appearing] : well-appearing [No Masses] : no abdominal mass palpated [No HSM] : no HSM [Normal Bowel Sounds] : normal bowel sounds [No Joint Swelling] : no joint swelling [Grossly Normal Strength/Tone] : grossly normal strength/tone [Coordination Grossly Intact] : coordination grossly intact [Normal Gait] : normal gait [No Focal Deficits] : no focal deficits [Normal Affect] : the affect was normal [Alert and Oriented x3] : oriented to person, place, and time [Normal Insight/Judgement] : insight and judgment were intact [de-identified] : no abnormal lateral curvature of spine, + kyphosis  [No Acute Distress] : no acute distress [Well Developed] : well developed [Normal Sclera/Conjunctiva] : normal sclera/conjunctiva [EOMI] : extraocular movements intact [No Respiratory Distress] : no respiratory distress  [No Accessory Muscle Use] : no accessory muscle use [Clear to Auscultation] : lungs were clear to auscultation bilaterally [Normal Rate] : normal rate  [Regular Rhythm] : with a regular rhythm [Normal S1, S2] : normal S1 and S2 [No Murmur] : no murmur heard [No Edema] : there was no peripheral edema [Soft] : abdomen soft [Non Tender] : non-tender [Non-distended] : non-distended

## 2020-01-15 NOTE — HISTORY OF PRESENT ILLNESS
[FreeTextEntry1] : 63 yo Mormon F with a PMH GERD, HLD, and chronic sinusitis who presents for a CPE.   [de-identified] : 63 yo Islam F with a PMH GERD, HLD, and chronic sinusitis who presents to review her stress test.  Pt endorsed dyspnea w/ exertion during last visit\par

## 2020-01-15 NOTE — PHYSICAL EXAM
[Well Nourished] : well nourished [Well-Appearing] : well-appearing [No Masses] : no abdominal mass palpated [No HSM] : no HSM [No Joint Swelling] : no joint swelling [Normal Bowel Sounds] : normal bowel sounds [Grossly Normal Strength/Tone] : grossly normal strength/tone [Normal Gait] : normal gait [Coordination Grossly Intact] : coordination grossly intact [No Focal Deficits] : no focal deficits [Alert and Oriented x3] : oriented to person, place, and time [Normal Affect] : the affect was normal [Normal Insight/Judgement] : insight and judgment were intact [de-identified] : no abnormal lateral curvature of spine, + kyphosis  [No Acute Distress] : no acute distress [Well Developed] : well developed [Normal Sclera/Conjunctiva] : normal sclera/conjunctiva [EOMI] : extraocular movements intact [No Respiratory Distress] : no respiratory distress  [No Accessory Muscle Use] : no accessory muscle use [Clear to Auscultation] : lungs were clear to auscultation bilaterally [Normal Rate] : normal rate  [Regular Rhythm] : with a regular rhythm [Normal S1, S2] : normal S1 and S2 [No Murmur] : no murmur heard [No Edema] : there was no peripheral edema [Soft] : abdomen soft [Non Tender] : non-tender [Non-distended] : non-distended

## 2020-01-15 NOTE — ASSESSMENT
[FreeTextEntry1] : 65 yo Roman Catholic F with a PMH GERD, HLD, and chronic sinusitis who presents for a CPE w/ ROS + for chest pain and dyspnea on exertion, GERD refractory to omeprazole and persistent back pain 2/2 kyphosis\par \par # Angina\par - ordered nuclear stress test \par - obtained EKG which was not different from previous EKG (non-specific STTW changes) \par - started pt on ASA; BP slightly elevated, started pt on 12.5 Losartan, renewed lipitor 40 and ordered lipid panel and A1C\par \par # Dypsnea on exertion\par - no CHF sx's of PND, orthopnea of LE swelling\par - likely anginal equiavelent\par - see plan above\par \par # GERD\par - started pt protonix 40; discussed avoiding spicy and triggering foods \par - pt had EGD in 2016 which showed LA Grade A esophagitis that was biopsied and showed squamous mucosa, small hiatus hernia and gastritis which was also biopsied and was neg for H. pylori \par - will have pt follow up in 5 weeks to assess response to protonix and food changes; if pt still experiencing GERD please order H. pylori stool Ag and refer to GI\par \par # Back pain\par - gave script to PT \par - advised to limit NSAIDs and when she does take them to always take on a full stomach and not to take more than recommended dose\par \par # HCM\par - ordered mammogram\par - pt received flu vaccine\par - no PAP indicated\par - pt will need to get a repeat colonoscopy in 2022 (last 2017 w/ 7mm sessile polyp; path report showed tubular adenoma and multiple polyps in cecum; path showed prominent lymphoid follicles\par - f/u A1C, lipid profile, CBC, CMP, TSH\par \par RTC in 2 weeks for bmp (started losartan) and blood pressure check \par RTC in 5 weeks to assess stress test and response to protonix \par \par Tamara Fleming MD\par Internal Medicine, PGY-2 \par \par

## 2020-01-15 NOTE — ASSESSMENT
[FreeTextEntry1] : 63 yo Zoroastrianism F with a PMH GERD, HLD, and chronic sinusitis who presents for a CPE w/ ROS + for chest pain and dyspnea on exertion, GERD refractory to omeprazole and persistent back pain 2/2 kyphosis\par \par # Angina\par - ordered nuclear stress test \par - obtained EKG which was not different from previous EKG (non-specific STTW changes) \par - started pt on ASA; BP slightly elevated, started pt on 12.5 Losartan, renewed lipitor 40 and ordered lipid panel and A1C\par \par # Dypsnea on exertion\par - no CHF sx's of PND, orthopnea of LE swelling\par - likely anginal equiavelent\par - see plan above\par \par # GERD\par - started pt protonix 40; discussed avoiding spicy and triggering foods \par - pt had EGD in 2016 which showed LA Grade A esophagitis that was biopsied and showed squamous mucosa, small hiatus hernia and gastritis which was also biopsied and was neg for H. pylori \par - will have pt follow up in 5 weeks to assess response to protonix and food changes; if pt still experiencing GERD please order H. pylori stool Ag and refer to GI\par \par # Back pain\par - gave script to PT \par - advised to limit NSAIDs and when she does take them to always take on a full stomach and not to take more than recommended dose\par \par # HCM\par - ordered mammogram\par - pt received flu vaccine\par - no PAP indicated\par - pt will need to get a repeat colonoscopy in 2022 (last 2017 w/ 7mm sessile polyp; path report showed tubular adenoma and multiple polyps in cecum; path showed prominent lymphoid follicles\par - f/u A1C, lipid profile, CBC, CMP, TSH\par \par RTC in 2 weeks for bmp (started losartan) and blood pressure check \par RTC in 5 weeks to assess stress test and response to protonix \par \par Tamara Fleming MD\par Internal Medicine, PGY-2 \par \par

## 2020-01-16 NOTE — PHYSICAL EXAM
[No Acute Distress] : no acute distress [Well Developed] : well developed [EOMI] : extraocular movements intact [Normal Sclera/Conjunctiva] : normal sclera/conjunctiva [No Respiratory Distress] : no respiratory distress  [No Accessory Muscle Use] : no accessory muscle use [Clear to Auscultation] : lungs were clear to auscultation bilaterally [Regular Rhythm] : with a regular rhythm [Normal Rate] : normal rate  [Normal S1, S2] : normal S1 and S2 [No Murmur] : no murmur heard [No Edema] : there was no peripheral edema [Soft] : abdomen soft [Non Tender] : non-tender [No Masses] : no abdominal mass palpated [Non-distended] : non-distended [No HSM] : no HSM [Coordination Grossly Intact] : coordination grossly intact [No Focal Deficits] : no focal deficits

## 2020-01-16 NOTE — REVIEW OF SYSTEMS
[Nasal Discharge] : nasal discharge [Postnasal Drip] : postnasal drip [Shortness Of Breath] : shortness of breath [Dyspnea on Exertion] : dyspnea on exertion [Fever] : no fever [Chills] : no chills [Recent Change In Weight] : ~T no recent weight change [Sore Throat] : no sore throat [Lower Ext Edema] : no lower extremity edema [Chest Pain] : no chest pain [Palpitations] : no palpitations [Wheezing] : no wheezing [Orthopnea] : no orthopnea [Paroxysmal Nocturnal Dyspnea] : no paroxysmal nocturnal dyspnea [Abdominal Pain] : no abdominal pain [Cough] : no cough [Nausea] : no nausea [Constipation] : no constipation [Heartburn] : no heartburn [Vomiting] : no vomiting [Dysuria] : no dysuria [Hematuria] : no hematuria

## 2020-01-16 NOTE — ASSESSMENT
[FreeTextEntry1] : 63 yo Lutheran F with a PMH GERD, HLD, and chronic sinusitis who presents for a f/u visit for abnormal stress test \par \par # Abnormal stress test\par - gave cardiology referral\par - increased lipitor to 80mg \par - started on 25 Toprol XL to see if she may have improvement w/ dyspnea on exertion (anginal equivalent) \par - Pt pre-diabetic; went over diet modification - pt will f/u in 6 months \par \par # Dypsnea on exertion\par - no CHF sx's of PND, orthopnea of LE swelling\par - likely anginal equivalent\par - see plan above\par \par # GERD\par - during last visit discussed avoiding spicy and triggering foods - pt followed recs and no longer has GERD \par \par # Back pain\par - gave script to PT during last visit but pt did not go to PT \par \par # HCM\par -  mammogram completed 12/19; BIRADS 2 - will need repeat mammo next year \par - no PAP indicated\par - pt will need to get a repeat colonoscopy in 2022 (last 2017 w/ 7mm sessile polyp; path report showed tubular adenoma and multiple polyps in cecum; path showed prominent lymphoid follicles\par - CBC, CMP and TSH wnl last visit \par \par # HTN\par - pt /80 w/ out BP meds\par - started on toprol XL instead of losartan \par \par # RUE mass\par - pt has a smooth circumferential mass in RUE; likely a lipoma.  Ordered US for further assessment \par \par Tamara Fleming MD\par Internal Medicine, PGY-2

## 2020-01-16 NOTE — HISTORY OF PRESENT ILLNESS
[FreeTextEntry1] : f/u visit  [de-identified] : 65 yo Amish F with a PMH GERD, HLD, and chronic sinusitis who presents for a f/u visit for abnormal stress test \par \par # Abnormal stress test\par - gave cardiology referral\par - increased lipitor to 80mg \par - started on 25 Toprol XL to see if she may have improvement w/ dyspnea on exertion (anginal equivalent) \par - Pt pre-diabetic; went over diet modification - pt will f/u in 6 months \par \par # Dypsnea on exertion\par - no CHF sx's of PND, orthopnea of LE swelling\par - likely anginal equivalent\par - see plan above\par \par # GERD\par - during last visit discussed avoiding spicy and triggering foods - pt followed recs and no longer has GERD \par \par # Back pain\par - gave script to PT during last visit but pt did not go to PT \par \par # HCM\par -  mammogram completed 12/19; BIRADS 2 - will need repeat mammo next year \par - no PAP indicated\par - pt will need to get a repeat colonoscopy in 2022 (last 2017 w/ 7mm sessile polyp; path report showed tubular adenoma and multiple polyps in cecum; path showed prominent lymphoid follicles\par - CBC, CMP and TSH wnl last visit \par \par # HTN\par - pt /80 w/ out BP meds\par - started on toprol XL instead of losartan \par \par # RUE mass\par - pt has a smooth circumferential mass in RUE; likely a lipoma.  Ordered US for further assessment \par \par Tamara Fleming MD\par Internal Medicine, PGY-2

## 2020-01-22 DIAGNOSIS — R94.39 ABNORMAL RESULT OF OTHER CARDIOVASCULAR FUNCTION STUDY: ICD-10-CM

## 2020-01-22 DIAGNOSIS — E78.5 HYPERLIPIDEMIA, UNSPECIFIED: ICD-10-CM

## 2020-01-28 ENCOUNTER — APPOINTMENT (OUTPATIENT)
Dept: CARDIOLOGY | Facility: CLINIC | Age: 65
End: 2020-01-28
Payer: MEDICAID

## 2020-01-28 VITALS — SYSTOLIC BLOOD PRESSURE: 138 MMHG | DIASTOLIC BLOOD PRESSURE: 96 MMHG | OXYGEN SATURATION: 98 % | HEART RATE: 78 BPM

## 2020-01-28 VITALS — WEIGHT: 213 LBS | BODY MASS INDEX: 40.25 KG/M2

## 2020-01-28 DIAGNOSIS — R94.39 ABNORMAL RESULT OF OTHER CARDIOVASCULAR FUNCTION STUDY: ICD-10-CM

## 2020-01-28 DIAGNOSIS — M79.602 PAIN IN LEFT ARM: ICD-10-CM

## 2020-01-28 PROCEDURE — 93000 ELECTROCARDIOGRAM COMPLETE: CPT

## 2020-01-28 PROCEDURE — 99205 OFFICE O/P NEW HI 60 MIN: CPT

## 2020-01-28 NOTE — PHYSICAL EXAM
[General Appearance - Well Developed] : well developed [Normal Appearance] : normal appearance [Well Groomed] : well groomed [General Appearance - Well Nourished] : well nourished [No Deformities] : no deformities [General Appearance - In No Acute Distress] : no acute distress [Normal Conjunctiva] : the conjunctiva exhibited no abnormalities [Eyelids - No Xanthelasma] : the eyelids demonstrated no xanthelasmas [Normal Oral Mucosa] : normal oral mucosa [No Oral Pallor] : no oral pallor [No Oral Cyanosis] : no oral cyanosis [Normal Jugular Venous A Waves Present] : normal jugular venous A waves present [Normal Jugular Venous V Waves Present] : normal jugular venous V waves present [No Jugular Venous Blanca A Waves] : no jugular venous blanca A waves [Heart Rate And Rhythm] : heart rate and rhythm were normal [Heart Sounds] : normal S1 and S2 [Murmurs] : no murmurs present [Respiration, Rhythm And Depth] : normal respiratory rhythm and effort [Exaggerated Use Of Accessory Muscles For Inspiration] : no accessory muscle use [Auscultation Breath Sounds / Voice Sounds] : lungs were clear to auscultation bilaterally [Abdomen Soft] : soft [Abdomen Tenderness] : non-tender [Abdomen Mass (___ Cm)] : no abdominal mass palpated [Abnormal Walk] : normal gait [Gait - Sufficient For Exercise Testing] : the gait was sufficient for exercise testing [Nail Clubbing] : no clubbing of the fingernails [Cyanosis, Localized] : no localized cyanosis [Petechial Hemorrhages (___cm)] : no petechial hemorrhages [Skin Color & Pigmentation] : normal skin color and pigmentation [] : no rash [No Venous Stasis] : no venous stasis [Skin Lesions] : no skin lesions [No Skin Ulcers] : no skin ulcer [No Xanthoma] : no  xanthoma was observed [Oriented To Time, Place, And Person] : oriented to person, place, and time [Affect] : the affect was normal [Mood] : the mood was normal [No Anxiety] : not feeling anxious

## 2020-01-28 NOTE — DISCUSSION/SUMMARY
[FreeTextEntry1] : The patient is a 64-year-old female +FH with atypical symptoms but abnormal stress test.\par #1 CV-  Discussed cardiac CT vs cardiac cath. Patient and son would like to proceed with cardiac catheterization for final diagnosis in view of family history at similar age. Started on aspirin and metoprolol\par #2 Lipids- on atorvastatin\par #3 General- We discussed adherence to a Mediterranean diet, weight loss and at least 30 minutes of daily exercise.\par

## 2020-01-28 NOTE — HISTORY OF PRESENT ILLNESS
[FreeTextEntry1] : Roberto is a 64-year-old female +FH who presents with chest pain and shortness of breath. She had a stress test told to be abnormal and here for evaluation.  She gets left arm pain mostly after a night sleep. Noticed more short of breath easily. Miinimal exertion.

## 2020-01-31 ENCOUNTER — OUTPATIENT (OUTPATIENT)
Dept: OUTPATIENT SERVICES | Facility: HOSPITAL | Age: 65
LOS: 1 days | End: 2020-01-31
Payer: MEDICAID

## 2020-01-31 VITALS
OXYGEN SATURATION: 100 % | WEIGHT: 214.07 LBS | HEIGHT: 60 IN | TEMPERATURE: 98 F | DIASTOLIC BLOOD PRESSURE: 76 MMHG | HEART RATE: 64 BPM | RESPIRATION RATE: 18 BRPM | SYSTOLIC BLOOD PRESSURE: 125 MMHG

## 2020-01-31 DIAGNOSIS — Z98.89 OTHER SPECIFIED POSTPROCEDURAL STATES: Chronic | ICD-10-CM

## 2020-01-31 DIAGNOSIS — R94.31 ABNORMAL ELECTROCARDIOGRAM [ECG] [EKG]: ICD-10-CM

## 2020-01-31 DIAGNOSIS — Z90.710 ACQUIRED ABSENCE OF BOTH CERVIX AND UTERUS: Chronic | ICD-10-CM

## 2020-01-31 DIAGNOSIS — Z98.51 TUBAL LIGATION STATUS: Chronic | ICD-10-CM

## 2020-01-31 LAB
ALBUMIN SERPL ELPH-MCNC: 4.3 G/DL — SIGNIFICANT CHANGE UP (ref 3.3–5)
ALP SERPL-CCNC: 94 U/L — SIGNIFICANT CHANGE UP (ref 40–120)
ALT FLD-CCNC: 22 U/L — SIGNIFICANT CHANGE UP (ref 10–45)
ANION GAP SERPL CALC-SCNC: 15 MMOL/L — SIGNIFICANT CHANGE UP (ref 5–17)
AST SERPL-CCNC: 27 U/L — SIGNIFICANT CHANGE UP (ref 10–40)
BILIRUB SERPL-MCNC: 0.6 MG/DL — SIGNIFICANT CHANGE UP (ref 0.2–1.2)
BUN SERPL-MCNC: 20 MG/DL — SIGNIFICANT CHANGE UP (ref 7–23)
CALCIUM SERPL-MCNC: 9.4 MG/DL — SIGNIFICANT CHANGE UP (ref 8.4–10.5)
CHLORIDE SERPL-SCNC: 102 MMOL/L — SIGNIFICANT CHANGE UP (ref 96–108)
CO2 SERPL-SCNC: 24 MMOL/L — SIGNIFICANT CHANGE UP (ref 22–31)
CREAT SERPL-MCNC: 1.02 MG/DL — SIGNIFICANT CHANGE UP (ref 0.5–1.3)
GLUCOSE SERPL-MCNC: 98 MG/DL — SIGNIFICANT CHANGE UP (ref 70–99)
HCT VFR BLD CALC: 43 % — SIGNIFICANT CHANGE UP (ref 34.5–45)
HGB BLD-MCNC: 13.5 G/DL — SIGNIFICANT CHANGE UP (ref 11.5–15.5)
MCHC RBC-ENTMCNC: 28.6 PG — SIGNIFICANT CHANGE UP (ref 27–34)
MCHC RBC-ENTMCNC: 31.4 GM/DL — LOW (ref 32–36)
MCV RBC AUTO: 91.1 FL — SIGNIFICANT CHANGE UP (ref 80–100)
NRBC # BLD: 0 /100 WBCS — SIGNIFICANT CHANGE UP (ref 0–0)
PLATELET # BLD AUTO: 264 K/UL — SIGNIFICANT CHANGE UP (ref 150–400)
POTASSIUM SERPL-MCNC: 4.6 MMOL/L — SIGNIFICANT CHANGE UP (ref 3.5–5.3)
POTASSIUM SERPL-SCNC: 4.6 MMOL/L — SIGNIFICANT CHANGE UP (ref 3.5–5.3)
PROT SERPL-MCNC: 7.9 G/DL — SIGNIFICANT CHANGE UP (ref 6–8.3)
RBC # BLD: 4.72 M/UL — SIGNIFICANT CHANGE UP (ref 3.8–5.2)
RBC # FLD: 12.7 % — SIGNIFICANT CHANGE UP (ref 10.3–14.5)
SODIUM SERPL-SCNC: 141 MMOL/L — SIGNIFICANT CHANGE UP (ref 135–145)
WBC # BLD: 5.99 K/UL — SIGNIFICANT CHANGE UP (ref 3.8–10.5)
WBC # FLD AUTO: 5.99 K/UL — SIGNIFICANT CHANGE UP (ref 3.8–10.5)

## 2020-01-31 PROCEDURE — 85027 COMPLETE CBC AUTOMATED: CPT

## 2020-01-31 PROCEDURE — C1769: CPT

## 2020-01-31 PROCEDURE — 93458 L HRT ARTERY/VENTRICLE ANGIO: CPT | Mod: 26

## 2020-01-31 PROCEDURE — 99152 MOD SED SAME PHYS/QHP 5/>YRS: CPT

## 2020-01-31 PROCEDURE — C1887: CPT

## 2020-01-31 PROCEDURE — C1894: CPT

## 2020-01-31 PROCEDURE — 93005 ELECTROCARDIOGRAM TRACING: CPT

## 2020-01-31 PROCEDURE — 93458 L HRT ARTERY/VENTRICLE ANGIO: CPT

## 2020-01-31 PROCEDURE — 80053 COMPREHEN METABOLIC PANEL: CPT

## 2020-01-31 PROCEDURE — 93010 ELECTROCARDIOGRAM REPORT: CPT

## 2020-01-31 RX ORDER — FUROSEMIDE 40 MG
1 TABLET ORAL
Qty: 30 | Refills: 0
Start: 2020-01-31 | End: 2020-02-29

## 2020-01-31 NOTE — H&P CARDIOLOGY - FAMILY HISTORY
Sibling  Still living? Yes, Estimated age: 51-60  Family history of early CAD, Age at diagnosis: Age Unknown

## 2020-01-31 NOTE — H&P CARDIOLOGY - HISTORY OF PRESENT ILLNESS
64-year-old  female with FHx who presents with chest pain and shortness of breath. Pt reports left arm pain mostly after a night sleep. Pt also reports associated short of breath worse on minimal exertion. Pt underwent Nuclear Stress Test on 12/9/19 which was Abnormal.     < from: Nuclear Stress Test-Exercise (12.09.19 @ 15:45) >  IMPRESSIONS: Abnormal Study  * Technically difficult study due to body habitus.  * Exercise capacity: 5 METS, Poor for age and gender.  * Chest Pain: No chest pain with exercise.  * Symptom: Shortness of breath.  * HR Response: Excessive increase in HR with stress due to poor physical condition and/or reduced cardiovascular reserve.  *BP Response: Appropriate.  * Heart Rhythm: Sinus Rhythm - 94 BPM.  * Baseline ECG: Nonspecific ST-T wave abnormality.  Poor R wave progression.  * ECG Changes: No significant ischemic ST segment changes beyond baseline abnormalities.  * Arrhythmia: None.  * Review of raw data shows: Breast attenuation artifact.  * There are also mild to moderate defects in the basal septal, mid to distal inferior, and mid to distal inferolateral walls that are mostly fixed, partially correct with prone imaging, and demonstrate normal wall motion suggestive of attenuation artifact.  * Post-stress gated wall motion analysis was performed (LVEF = 64 %;LVEDV = 65 ml.) revealing reduced systolic thickening of the distal anterior, distal septal, and apical walls with preserved overall left ventricular ejection fraction.  The left ventricle was normal in size. There are medium-sized, mild to moderate defects in the distal anterior, distal septal, and apical walls that are mostly fixed suggestive of scar with minimal ischemia.    Symptoms:        Angina (Class): Class III       Ischemic Symptoms: chest pain and SOB.    Heart Failure: No    Assessment of LVEF:       EF: 64%       Assessed by: NST       Date: 12/9/19    Prior Cardiac Interventions:       PCI's:        CABG:     Noninvasive Testing:   Stress Test: Date: 12/9/19       Protocol: Je       Duration of Exercise: 5 METS       Symptoms: SOB       EKG Changes: No significant ischemic changes beyond baseline abnormalities       Myocardial Imaging:  There are also mild to moderate defects in the basal septal, mid to distal inferior, and mid to distal inferolateral walls that are mostly fixed, partially correct with prone imaging, and demonstrate normal wall motion suggestive of attenuation artifact.  There are medium-sized, mild to moderate defects in the distal anterior, distal septal, and apical walls that are mostly fixed suggestive of scar with minimal ischemia.    Antianginal Therapies:        Beta Blockers: Metoprolol        Calcium Channel Blockers: No       Long Acting Nitrates: No       Ranexa: No    Associated Risk Factors:        Cerebrovascular Disease: No       Chronic Lung Disease: No       Peripheral Arterial Disease: No       Chronic Kidney Disease (if yes, what is GFR): No       Uncontrolled Diabetes (if yes, what is HgbA1C or FBS): No       Poorly Controlled Hypertension (if yes, what is SBP): No       Morbid Obesity (if yes, what is BMI): No       History of Recent Ventricular Arrhythmia: No       Inability to Ambulate Safely: No       Need for Therapeutic Anticoagulation: No       Antiplatelet or Contrast Allergy: No 64-year-old  female with FHx (mother passed away with MI, both younger brothers with CAD), PMHx Obese, HTN, HLD, GERD who presents with chest pain and shortness of breath. Pt reports left arm pain mostly after a night sleep. Pt also reports associated short of breath worse on minimal exertion. Exercise tolerance of ambulation of 1 city block and 1 flight of stairs.  Pt underwent Nuclear Stress Test on 12/9/19 which was Abnormal. Pt denies any implantable devices.     < from: Nuclear Stress Test-Exercise (12.09.19 @ 15:45) >  IMPRESSIONS: Abnormal Study  * Technically difficult study due to body habitus.  * Exercise capacity: 5 METS, Poor for age and gender.  * Chest Pain: No chest pain with exercise.  * Symptom: Shortness of breath.  * HR Response: Excessive increase in HR with stress due to poor physical condition and/or reduced cardiovascular reserve.  *BP Response: Appropriate.  * Heart Rhythm: Sinus Rhythm - 94 BPM.  * Baseline ECG: Nonspecific ST-T wave abnormality.  Poor R wave progression.  * ECG Changes: No significant ischemic ST segment changes beyond baseline abnormalities.  * Arrhythmia: None.  * Review of raw data shows: Breast attenuation artifact.  * There are also mild to moderate defects in the basal septal, mid to distal inferior, and mid to distal inferolateral walls that are mostly fixed, partially correct with prone imaging, and demonstrate normal wall motion suggestive of attenuation artifact.  * Post-stress gated wall motion analysis was performed (LVEF = 64 %;LVEDV = 65 ml.) revealing reduced systolic thickening of the distal anterior, distal septal, and apical walls with preserved overall left ventricular ejection fraction.  The left ventricle was normal in size. There are medium-sized, mild to moderate defects in the distal anterior, distal septal, and apical walls that are mostly fixed suggestive of scar with minimal ischemia.    Symptoms:        Angina (Class): Class III       Ischemic Symptoms: chest pain and SOB.    Heart Failure: No    Assessment of LVEF:       EF: 64%       Assessed by: NST       Date: 12/9/19    Prior Cardiac Interventions: No       PCI's: No       CABG: No    Noninvasive Testing:   Stress Test: Date: 12/9/19       Protocol: Ej       Duration of Exercise: 5 METS       Symptoms: SOB       EKG Changes: No significant ischemic changes beyond baseline abnormalities       Myocardial Imaging:  There are also mild to moderate defects in the basal septal, mid to distal inferior, and mid to distal inferolateral walls that are mostly fixed, partially correct with prone imaging, and demonstrate normal wall motion suggestive of attenuation artifact.  There are medium-sized, mild to moderate defects in the distal anterior, distal septal, and apical walls that are mostly fixed suggestive of scar with minimal ischemia.    Antianginal Therapies:        Beta Blockers: Metoprolol        Calcium Channel Blockers: No       Long Acting Nitrates: No       Ranexa: No    Associated Risk Factors:        Cerebrovascular Disease: No       Chronic Lung Disease: No       Peripheral Arterial Disease: No       Chronic Kidney Disease (if yes, what is GFR): No       Uncontrolled Diabetes (if yes, what is HgbA1C or FBS): No       Poorly Controlled Hypertension (if yes, what is SBP): No       Morbid Obesity (if yes, what is BMI): Yes, 41       History of Recent Ventricular Arrhythmia: No       Inability to Ambulate Safely: No       Need for Therapeutic Anticoagulation: No       Antiplatelet or Contrast Allergy: No 64-year-old  female with FHx (mother passed away with MI, both younger brothers with CAD), PMHx Obese, HTN, HLD, GERD who presents with chest pain and shortness of breath. Pt reports intermittent left arm pain and was awakened from her sleep secondary to pain. Pt also reports associated short of breath worse on minimal exertion. Exercise tolerance of ambulation of 1 city block and 1 flight of stairs.  Pt underwent Nuclear Stress Test on 12/9/19 which was Abnormal. Pt denies any implantable devices.     < from: Nuclear Stress Test-Exercise (12.09.19 @ 15:45) >  IMPRESSIONS: Abnormal Study  * Technically difficult study due to body habitus.  * Exercise capacity: 5 METS, Poor for age and gender.  * Chest Pain: No chest pain with exercise.  * Symptom: Shortness of breath.  * HR Response: Excessive increase in HR with stress due to poor physical condition and/or reduced cardiovascular reserve.  *BP Response: Appropriate.  * Heart Rhythm: Sinus Rhythm - 94 BPM.  * Baseline ECG: Nonspecific ST-T wave abnormality.  Poor R wave progression.  * ECG Changes: No significant ischemic ST segment changes beyond baseline abnormalities.  * Arrhythmia: None.  * Review of raw data shows: Breast attenuation artifact.  * There are also mild to moderate defects in the basal septal, mid to distal inferior, and mid to distal inferolateral walls that are mostly fixed, partially correct with prone imaging, and demonstrate normal wall motion suggestive of attenuation artifact.  * Post-stress gated wall motion analysis was performed (LVEF = 64 %;LVEDV = 65 ml.) revealing reduced systolic thickening of the distal anterior, distal septal, and apical walls with preserved overall left ventricular ejection fraction.  The left ventricle was normal in size. There are medium-sized, mild to moderate defects in the distal anterior, distal septal, and apical walls that are mostly fixed suggestive of scar with minimal ischemia.    Symptoms:        Angina (Class): Class III       Ischemic Symptoms: chest pain and SOB.    Heart Failure: No    Assessment of LVEF:       EF: 64%       Assessed by: NST       Date: 12/9/19    Prior Cardiac Interventions: No       PCI's: No       CABG: No    Noninvasive Testing:   Stress Test: Date: 12/9/19       Protocol: Je       Duration of Exercise: 5 METS       Symptoms: SOB       EKG Changes: No significant ischemic changes beyond baseline abnormalities       Myocardial Imaging:  There are also mild to moderate defects in the basal septal, mid to distal inferior, and mid to distal inferolateral walls that are mostly fixed, partially correct with prone imaging, and demonstrate normal wall motion suggestive of attenuation artifact.  There are medium-sized, mild to moderate defects in the distal anterior, distal septal, and apical walls that are mostly fixed suggestive of scar with minimal ischemia.    Antianginal Therapies:        Beta Blockers: Metoprolol        Calcium Channel Blockers: No       Long Acting Nitrates: No       Ranexa: No    Associated Risk Factors:        Cerebrovascular Disease: No       Chronic Lung Disease: No       Peripheral Arterial Disease: No       Chronic Kidney Disease (if yes, what is GFR): No       Uncontrolled Diabetes (if yes, what is HgbA1C or FBS): No       Poorly Controlled Hypertension (if yes, what is SBP): No       Morbid Obesity (if yes, what is BMI): Yes, 41       History of Recent Ventricular Arrhythmia: No       Inability to Ambulate Safely: No       Need for Therapeutic Anticoagulation: No       Antiplatelet or Contrast Allergy: No 64-year-old  female with FHx (mother passed away with MI, both younger brothers with CAD), PMHx Obese, HTN, HLD, GERD who presents with chest pain and shortness of breath. Pt reports intermittent left arm pain and was awakened from her sleep secondary to pain. Pt also reports associated short of breath worse on minimal exertion. Exercise tolerance of ambulation of 1 city block and 1 flight of stairs. Pt had Diagnostic Cath in 2015-Normal Coronaries.  Pt underwent Nuclear Stress Test on 12/9/19 which was Abnormal. Pt denies any implantable devices.     < from: Cardiac Cath Lab - Adult (03.17.15 @ 16:18) >  VENTRICLES:Global left ventricular function was normal. EF estimated was 55 %.  CORONARY VESSELS: The coronary circulation is left dominant.  LM:   --  LM: Normal.  LAD:   --  LAD: Normal.  --  Mid LAD: Myocardial bridging was present.  CX:   --  Circumflex: Normal.  RCA:   --  RCA: Normal.    < from: Nuclear Stress Test-Exercise (12.09.19 @ 15:45) >  IMPRESSIONS: Abnormal Study  * Technically difficult study due to body habitus.  * Exercise capacity: 5 METS, Poor for age and gender.  * Chest Pain: No chest pain with exercise.  * Symptom: Shortness of breath.  * HR Response: Excessive increase in HR with stress due to poor physical condition and/or reduced cardiovascular reserve.  *BP Response: Appropriate.  * Heart Rhythm: Sinus Rhythm - 94 BPM.  * Baseline ECG: Nonspecific ST-T wave abnormality.  Poor R wave progression.  * ECG Changes: No significant ischemic ST segment changes beyond baseline abnormalities.  * Arrhythmia: None.  * Review of raw data shows: Breast attenuation artifact.  * There are also mild to moderate defects in the basal septal, mid to distal inferior, and mid to distal inferolateral walls that are mostly fixed, partially correct with prone imaging, and demonstrate normal wall motion suggestive of attenuation artifact.  * Post-stress gated wall motion analysis was performed (LVEF = 64 %;LVEDV = 65 ml.) revealing reduced systolic thickening of the distal anterior, distal septal, and apical walls with preserved overall left ventricular ejection fraction.  The left ventricle was normal in size. There are medium-sized, mild to moderate defects in the distal anterior, distal septal, and apical walls that are mostly fixed suggestive of scar with minimal ischemia.    Symptoms:        Angina (Class): Class III       Ischemic Symptoms: chest pain and SOB.    Heart Failure: No    Assessment of LVEF:       EF: 64%       Assessed by: NST       Date: 12/9/19    Prior Cardiac Interventions: No       PCI's: No       CABG: No    Noninvasive Testing:   Stress Test: Date: 12/9/19       Protocol: Je       Duration of Exercise: 5 METS       Symptoms: SOB       EKG Changes: No significant ischemic changes beyond baseline abnormalities       Myocardial Imaging:  There are also mild to moderate defects in the basal septal, mid to distal inferior, and mid to distal inferolateral walls that are mostly fixed, partially correct with prone imaging, and demonstrate normal wall motion suggestive of attenuation artifact.  There are medium-sized, mild to moderate defects in the distal anterior, distal septal, and apical walls that are mostly fixed suggestive of scar with minimal ischemia.    Antianginal Therapies:        Beta Blockers: Metoprolol        Calcium Channel Blockers: No       Long Acting Nitrates: No       Ranexa: No    Associated Risk Factors:        Cerebrovascular Disease: No       Chronic Lung Disease: No       Peripheral Arterial Disease: No       Chronic Kidney Disease (if yes, what is GFR): No       Uncontrolled Diabetes (if yes, what is HgbA1C or FBS): No       Poorly Controlled Hypertension (if yes, what is SBP): No       Morbid Obesity (if yes, what is BMI): Yes, 41       History of Recent Ventricular Arrhythmia: No       Inability to Ambulate Safely: No       Need for Therapeutic Anticoagulation: No       Antiplatelet or Contrast Allergy: No 64-year-old  female with FHx (mother passed away with MI, both younger brothers with CAD), PMHx Obese, HTN, HLD, GERD who presents with chest pain and shortness of breath. Pt reports intermittent left arm pain and was awakened from her sleep secondary to pain. Pt also reports associated short of breath worse on minimal exertion. Exercise tolerance of ambulation of 1 city block and 1 flight of stairs. Pt had Diagnostic Cath in 2015-Normal Coronaries.  Pt underwent Nuclear Stress Test on 12/9/19 which was Abnormal. Pt denies any implantable devices. Pt was referred for Cardiac Cath.     < from: Cardiac Cath Lab - Adult (03.17.15 @ 16:18) >  VENTRICLES:Global left ventricular function was normal. EF estimated was 55 %.  CORONARY VESSELS: The coronary circulation is left dominant.  LM:   --  LM: Normal.  LAD:   --  LAD: Normal.  --  Mid LAD: Myocardial bridging was present.  CX:   --  Circumflex: Normal.  RCA:   --  RCA: Normal.    < from: Nuclear Stress Test-Exercise (12.09.19 @ 15:45) >  IMPRESSIONS: Abnormal Study  * Technically difficult study due to body habitus.  * Exercise capacity: 5 METS, Poor for age and gender.  * Chest Pain: No chest pain with exercise.  * Symptom: Shortness of breath.  * HR Response: Excessive increase in HR with stress due to poor physical condition and/or reduced cardiovascular reserve.  *BP Response: Appropriate.  * Heart Rhythm: Sinus Rhythm - 94 BPM.  * Baseline ECG: Nonspecific ST-T wave abnormality.  Poor R wave progression.  * ECG Changes: No significant ischemic ST segment changes beyond baseline abnormalities.  * Arrhythmia: None.  * Review of raw data shows: Breast attenuation artifact.  * There are also mild to moderate defects in the basal septal, mid to distal inferior, and mid to distal inferolateral walls that are mostly fixed, partially correct with prone imaging, and demonstrate normal wall motion suggestive of attenuation artifact.  * Post-stress gated wall motion analysis was performed (LVEF = 64 %;LVEDV = 65 ml.) revealing reduced systolic thickening of the distal anterior, distal septal, and apical walls with preserved overall left ventricular ejection fraction.  The left ventricle was normal in size. There are medium-sized, mild to moderate defects in the distal anterior, distal septal, and apical walls that are mostly fixed suggestive of scar with minimal ischemia.    Symptoms:        Angina (Class): Class III       Ischemic Symptoms: chest pain and SOB.    Heart Failure: No    Assessment of LVEF:       EF: 64%       Assessed by: NST       Date: 12/9/19    Prior Cardiac Interventions: No       PCI's: No       CABG: No    Noninvasive Testing:   Stress Test: Date: 12/9/19       Protocol: Je       Duration of Exercise: 5 METS       Symptoms: SOB       EKG Changes: No significant ischemic changes beyond baseline abnormalities       Myocardial Imaging:  There are also mild to moderate defects in the basal septal, mid to distal inferior, and mid to distal inferolateral walls that are mostly fixed, partially correct with prone imaging, and demonstrate normal wall motion suggestive of attenuation artifact.  There are medium-sized, mild to moderate defects in the distal anterior, distal septal, and apical walls that are mostly fixed suggestive of scar with minimal ischemia.    Antianginal Therapies:        Beta Blockers: Metoprolol        Calcium Channel Blockers: No       Long Acting Nitrates: No       Ranexa: No    Associated Risk Factors:        Cerebrovascular Disease: No       Chronic Lung Disease: No       Peripheral Arterial Disease: No       Chronic Kidney Disease (if yes, what is GFR): No       Uncontrolled Diabetes (if yes, what is HgbA1C or FBS): No       Poorly Controlled Hypertension (if yes, what is SBP): No       Morbid Obesity (if yes, what is BMI): Yes, 41       History of Recent Ventricular Arrhythmia: No       Inability to Ambulate Safely: No       Need for Therapeutic Anticoagulation: No       Antiplatelet or Contrast Allergy: No

## 2020-09-23 PROBLEM — I10 ESSENTIAL (PRIMARY) HYPERTENSION: Chronic | Status: ACTIVE | Noted: 2020-01-31

## 2020-10-30 ENCOUNTER — LABORATORY RESULT (OUTPATIENT)
Age: 65
End: 2020-10-30

## 2020-10-30 ENCOUNTER — OUTPATIENT (OUTPATIENT)
Dept: OUTPATIENT SERVICES | Facility: HOSPITAL | Age: 65
LOS: 1 days | End: 2020-10-30
Payer: MEDICAID

## 2020-10-30 ENCOUNTER — RESULT CHARGE (OUTPATIENT)
Age: 65
End: 2020-10-30

## 2020-10-30 ENCOUNTER — APPOINTMENT (OUTPATIENT)
Dept: INTERNAL MEDICINE | Facility: CLINIC | Age: 65
End: 2020-10-30
Payer: MEDICAID

## 2020-10-30 VITALS
WEIGHT: 213 LBS | HEIGHT: 61 IN | BODY MASS INDEX: 40.22 KG/M2 | DIASTOLIC BLOOD PRESSURE: 90 MMHG | SYSTOLIC BLOOD PRESSURE: 130 MMHG

## 2020-10-30 DIAGNOSIS — M79.605 PAIN IN RIGHT LEG: ICD-10-CM

## 2020-10-30 DIAGNOSIS — M79.604 PAIN IN RIGHT LEG: ICD-10-CM

## 2020-10-30 DIAGNOSIS — Z98.89 OTHER SPECIFIED POSTPROCEDURAL STATES: Chronic | ICD-10-CM

## 2020-10-30 DIAGNOSIS — I10 ESSENTIAL (PRIMARY) HYPERTENSION: ICD-10-CM

## 2020-10-30 DIAGNOSIS — Z98.51 TUBAL LIGATION STATUS: Chronic | ICD-10-CM

## 2020-10-30 DIAGNOSIS — Z90.710 ACQUIRED ABSENCE OF BOTH CERVIX AND UTERUS: Chronic | ICD-10-CM

## 2020-10-30 LAB
BILIRUB UR QL STRIP: NORMAL
CLARITY UR: CLEAR
COLLECTION METHOD: NORMAL
GLUCOSE UR-MCNC: NORMAL
HCG UR QL: 0.2 EU/DL
HGB UR QL STRIP.AUTO: NORMAL
KETONES UR-MCNC: NORMAL
LEUKOCYTE ESTERASE UR QL STRIP: NORMAL
NITRITE UR QL STRIP: NORMAL
PH UR STRIP: 6.5
PROT UR STRIP-MCNC: NORMAL
SP GR UR STRIP: 1.01

## 2020-10-30 PROCEDURE — 85027 COMPLETE CBC AUTOMATED: CPT

## 2020-10-30 PROCEDURE — 87186 SC STD MICRODIL/AGAR DIL: CPT

## 2020-10-30 PROCEDURE — 81003 URINALYSIS AUTO W/O SCOPE: CPT

## 2020-10-30 PROCEDURE — 99214 OFFICE O/P EST MOD 30 MIN: CPT | Mod: GC

## 2020-10-30 PROCEDURE — 86769 SARS-COV-2 COVID-19 ANTIBODY: CPT

## 2020-10-30 PROCEDURE — 87086 URINE CULTURE/COLONY COUNT: CPT

## 2020-10-30 PROCEDURE — 80061 LIPID PANEL: CPT

## 2020-10-30 PROCEDURE — 80053 COMPREHEN METABOLIC PANEL: CPT

## 2020-10-30 PROCEDURE — 83036 HEMOGLOBIN GLYCOSYLATED A1C: CPT

## 2020-10-30 PROCEDURE — G0463: CPT

## 2020-10-31 ENCOUNTER — LABORATORY RESULT (OUTPATIENT)
Age: 65
End: 2020-10-31

## 2020-10-31 LAB
A1C WITH ESTIMATED AVERAGE GLUCOSE RESULT: 6.1 % — HIGH (ref 4–5.6)
ALBUMIN SERPL ELPH-MCNC: 4.4 G/DL — SIGNIFICANT CHANGE UP (ref 3.3–5)
ALP SERPL-CCNC: 99 U/L — SIGNIFICANT CHANGE UP (ref 40–120)
ALT FLD-CCNC: 19 U/L — SIGNIFICANT CHANGE UP (ref 10–45)
ANION GAP SERPL CALC-SCNC: 11 MMOL/L — SIGNIFICANT CHANGE UP (ref 5–17)
APPEARANCE UR: ABNORMAL
AST SERPL-CCNC: 29 U/L — SIGNIFICANT CHANGE UP (ref 10–40)
BILIRUB SERPL-MCNC: 0.5 MG/DL — SIGNIFICANT CHANGE UP (ref 0.2–1.2)
BILIRUB UR-MCNC: NEGATIVE — SIGNIFICANT CHANGE UP
BUN SERPL-MCNC: 18 MG/DL — SIGNIFICANT CHANGE UP (ref 7–23)
CALCIUM SERPL-MCNC: 10.3 MG/DL — SIGNIFICANT CHANGE UP (ref 8.4–10.5)
CHLORIDE SERPL-SCNC: 105 MMOL/L — SIGNIFICANT CHANGE UP (ref 96–108)
CHOLEST SERPL-MCNC: 156 MG/DL — SIGNIFICANT CHANGE UP
CO2 SERPL-SCNC: 23 MMOL/L — SIGNIFICANT CHANGE UP (ref 22–31)
COLOR SPEC: SIGNIFICANT CHANGE UP
CREAT SERPL-MCNC: 1.04 MG/DL — SIGNIFICANT CHANGE UP (ref 0.5–1.3)
DIFF PNL FLD: ABNORMAL
ESTIMATED AVERAGE GLUCOSE: 128 MG/DL — HIGH (ref 68–114)
GLUCOSE SERPL-MCNC: 86 MG/DL — SIGNIFICANT CHANGE UP (ref 70–99)
GLUCOSE UR QL: NEGATIVE — SIGNIFICANT CHANGE UP
HCT VFR BLD CALC: 43.8 % — SIGNIFICANT CHANGE UP (ref 34.5–45)
HDLC SERPL-MCNC: 59 MG/DL — SIGNIFICANT CHANGE UP
HGB BLD-MCNC: 13.5 G/DL — SIGNIFICANT CHANGE UP (ref 11.5–15.5)
KETONES UR-MCNC: NEGATIVE — SIGNIFICANT CHANGE UP
LEUKOCYTE ESTERASE UR-ACNC: NEGATIVE — SIGNIFICANT CHANGE UP
LIPID PNL WITH DIRECT LDL SERPL: 80 MG/DL — SIGNIFICANT CHANGE UP
MCHC RBC-ENTMCNC: 28.7 PG — SIGNIFICANT CHANGE UP (ref 27–34)
MCHC RBC-ENTMCNC: 30.8 GM/DL — LOW (ref 32–36)
MCV RBC AUTO: 93.2 FL — SIGNIFICANT CHANGE UP (ref 80–100)
NITRITE UR-MCNC: NEGATIVE — SIGNIFICANT CHANGE UP
NON HDL CHOLESTEROL: 97 MG/DL — SIGNIFICANT CHANGE UP
PH UR: 6 — SIGNIFICANT CHANGE UP (ref 5–8)
PLATELET # BLD AUTO: 245 K/UL — SIGNIFICANT CHANGE UP (ref 150–400)
POTASSIUM SERPL-MCNC: 5.3 MMOL/L — SIGNIFICANT CHANGE UP (ref 3.5–5.3)
POTASSIUM SERPL-SCNC: 5.3 MMOL/L — SIGNIFICANT CHANGE UP (ref 3.5–5.3)
PROT SERPL-MCNC: 7.7 G/DL — SIGNIFICANT CHANGE UP (ref 6–8.3)
PROT UR-MCNC: NEGATIVE — SIGNIFICANT CHANGE UP
RBC # BLD: 4.7 M/UL — SIGNIFICANT CHANGE UP (ref 3.8–5.2)
RBC # FLD: 12.7 % — SIGNIFICANT CHANGE UP (ref 10.3–14.5)
SARS-COV-2 IGG SERPL QL IA: NEGATIVE — SIGNIFICANT CHANGE UP
SARS-COV-2 IGM SERPL IA-ACNC: 0.08 INDEX — SIGNIFICANT CHANGE UP
SODIUM SERPL-SCNC: 139 MMOL/L — SIGNIFICANT CHANGE UP (ref 135–145)
SP GR SPEC: 1.01 — LOW (ref 1.01–1.02)
TRIGL SERPL-MCNC: 83 MG/DL — SIGNIFICANT CHANGE UP
UROBILINOGEN FLD QL: SIGNIFICANT CHANGE UP
WBC # BLD: 6.21 K/UL — SIGNIFICANT CHANGE UP (ref 3.8–10.5)
WBC # FLD AUTO: 6.21 K/UL — SIGNIFICANT CHANGE UP (ref 3.8–10.5)

## 2020-11-06 ENCOUNTER — NON-APPOINTMENT (OUTPATIENT)
Age: 65
End: 2020-11-06

## 2020-11-06 RX ORDER — SULFAMETHOXAZOLE AND TRIMETHOPRIM 800; 160 MG/1; MG/1
800-160 TABLET ORAL TWICE DAILY
Qty: 10 | Refills: 0 | Status: COMPLETED | COMMUNITY
Start: 2020-11-06 | End: 2020-11-11

## 2020-11-11 NOTE — REVIEW OF SYSTEMS
[Incontinence] : incontinence [Nocturia] : nocturia [Frequency] : frequency [Muscle Pain] : muscle pain [Back Pain] : back pain [Fever] : no fever [Chills] : no chills [Vision Problems] : no vision problems [Chest Pain] : no chest pain [Palpitations] : no palpitations [Lower Ext Edema] : no lower extremity edema [Shortness Of Breath] : no shortness of breath [Cough] : no cough [Abdominal Pain] : no abdominal pain [Nausea] : no nausea [Vomiting] : no vomiting [Dysuria] : no dysuria [Hematuria] : no hematuria [Itching] : no itching [Skin Rash] : no skin rash [FreeTextEntry9] : lower back pain b/l

## 2020-11-11 NOTE — ASSESSMENT
[FreeTextEntry1] : 66 y/o F with PMH GERD, HLD, and chronic sinusitis p/w increased urinary frequency \par \par #Increased urinary frequency \par Possible Vaginal prolapse vs Urge incontinence vs UTI\par -UCx, UA w/ cytology \par -A1C\par -Urogyn referral \par \par #R Leg pain \par -PT referral \par \par #HCM\par -A1C, lipid profile, CBC, CMP, Covid antibody ordered \par \par RTC in 5-10 weeks

## 2020-11-11 NOTE — PHYSICAL EXAM
[Normal] : normal rate, regular rhythm, normal S1 and S2 and no murmur heard [No Edema] : there was no peripheral edema [Soft] : abdomen soft [Non Tender] : non-tender [Non-distended] : non-distended [Normal Bowel Sounds] : normal bowel sounds [No CVA Tenderness] : no CVA  tenderness [No Joint Swelling] : no joint swelling [Grossly Normal Strength/Tone] : grossly normal strength/tone [Speech Grossly Normal] : speech grossly normal [Normal Mood] : the mood was normal [de-identified] : Spinal tenderness in lower back. TTP in paraspinal muscles b/l. Straight leg raise neg  [de-identified] : TTP along R knee medially

## 2020-11-11 NOTE — HISTORY OF PRESENT ILLNESS
[FreeTextEntry1] : Increased urinary frequency  [de-identified] : 64 y/o [place] F with a PMH GERD, HLD, and chronic sinusitis who presents for a f/u visit. Pt complains of having to Urinate very often, sometimes patient cannot make it to the bathroom in time. Symptoms started a few weeks ago. Patient believes it is worse with drinking lots of water. Nocturia present (at least once per night). She states that her urine is clear. Denies any hematuria or dysuria. Patient has never had these symptoms before. Of note, patient has had 3 vaginal births. \par \par Patient also complains of R lower ext pain, intermittent, sometimes 10/10, mostly at night. Pain started about 2 months ago. No precipitating factors or trauma. Pain is worse when laying down/resting. Pain dec when walking. Of note, patient had a R ankle fracture 15+ years ago. \par \par

## 2020-11-12 DIAGNOSIS — M79.672 PAIN IN LEFT FOOT: ICD-10-CM

## 2020-11-18 ENCOUNTER — OUTPATIENT (OUTPATIENT)
Dept: OUTPATIENT SERVICES | Facility: HOSPITAL | Age: 65
LOS: 1 days | End: 2020-11-18
Payer: MEDICAID

## 2020-11-18 ENCOUNTER — APPOINTMENT (OUTPATIENT)
Dept: OBGYN | Facility: CLINIC | Age: 65
End: 2020-11-18
Payer: MEDICAID

## 2020-11-18 ENCOUNTER — RESULT CHARGE (OUTPATIENT)
Age: 65
End: 2020-11-18

## 2020-11-18 VITALS — SYSTOLIC BLOOD PRESSURE: 128 MMHG | BODY MASS INDEX: 40.25 KG/M2 | DIASTOLIC BLOOD PRESSURE: 80 MMHG | WEIGHT: 213 LBS

## 2020-11-18 VITALS — TEMPERATURE: 96 F

## 2020-11-18 DIAGNOSIS — Z98.51 TUBAL LIGATION STATUS: Chronic | ICD-10-CM

## 2020-11-18 DIAGNOSIS — N76.0 ACUTE VAGINITIS: ICD-10-CM

## 2020-11-18 DIAGNOSIS — Z90.710 ACQUIRED ABSENCE OF BOTH CERVIX AND UTERUS: Chronic | ICD-10-CM

## 2020-11-18 DIAGNOSIS — Z98.89 OTHER SPECIFIED POSTPROCEDURAL STATES: Chronic | ICD-10-CM

## 2020-11-18 DIAGNOSIS — R35.0 FREQUENCY OF MICTURITION: ICD-10-CM

## 2020-11-18 LAB
BILIRUB UR QL STRIP: NORMAL
CLARITY UR: CLEAR
COLLECTION METHOD: NORMAL
GLUCOSE UR-MCNC: NORMAL
HCG UR QL: 0.2 EU/DL
HGB UR QL STRIP.AUTO: NORMAL
KETONES UR-MCNC: NORMAL
LEUKOCYTE ESTERASE UR QL STRIP: NORMAL
NITRITE UR QL STRIP: NORMAL
PH UR STRIP: 6
PROT UR STRIP-MCNC: NORMAL
SP GR UR STRIP: 1.02

## 2020-11-18 PROCEDURE — 99202 OFFICE O/P NEW SF 15 MIN: CPT | Mod: GE

## 2020-11-18 PROCEDURE — G0463: CPT

## 2020-11-18 PROCEDURE — 87086 URINE CULTURE/COLONY COUNT: CPT

## 2020-11-19 ENCOUNTER — LABORATORY RESULT (OUTPATIENT)
Age: 65
End: 2020-11-19

## 2020-11-19 NOTE — PHYSICAL EXAM
[Appropriately responsive] : appropriately responsive [Alert] : alert [No Acute Distress] : no acute distress [Labia Majora] : normal [Labia Minora] : normal [Normal] : normal [Atrophy] : atrophy [Absent] : absent [FreeTextEntry5] : nonlabored breathing [FreeTextEntry6] : nonpalpable [FreeTextEntry8] : nonpalpable

## 2020-11-19 NOTE — REVIEW OF SYSTEMS
[Urgency] : urgency [Frequency] : frequency [Incontinence] : incontinence [Negative] : Psychiatric [Urethral Discharge] : no urethral discharge [Abn Vaginal bleeding] : no abnormal vaginal bleeding [CVA Pain] : no CVA pain [Genital Rash/Irritation] : no genital rash/irritation

## 2020-11-19 NOTE — END OF VISIT
[] : Resident [FreeTextEntry3] : Case discussed with resident, agree with management plan as above.\par \par Batool Stephenson MD\par

## 2020-11-20 LAB
CULTURE RESULTS: SIGNIFICANT CHANGE UP
SPECIMEN SOURCE: SIGNIFICANT CHANGE UP

## 2020-11-27 DIAGNOSIS — R35.0 FREQUENCY OF MICTURITION: ICD-10-CM

## 2020-12-14 ENCOUNTER — APPOINTMENT (OUTPATIENT)
Dept: UROGYNECOLOGY | Facility: CLINIC | Age: 65
End: 2020-12-14
Payer: MEDICAID

## 2020-12-14 VITALS
WEIGHT: 212 LBS | TEMPERATURE: 95.1 F | DIASTOLIC BLOOD PRESSURE: 98 MMHG | HEIGHT: 61 IN | SYSTOLIC BLOOD PRESSURE: 160 MMHG | BODY MASS INDEX: 40.02 KG/M2

## 2020-12-14 DIAGNOSIS — N39.41 URGE INCONTINENCE: ICD-10-CM

## 2020-12-14 PROCEDURE — 81003 URINALYSIS AUTO W/O SCOPE: CPT | Mod: QW

## 2020-12-14 PROCEDURE — 99072 ADDL SUPL MATRL&STAF TM PHE: CPT

## 2020-12-14 PROCEDURE — 99204 OFFICE O/P NEW MOD 45 MIN: CPT

## 2020-12-14 NOTE — PROCEDURE
[FreeTextEntry1] : A catheterized urine was performed to rule out urinary tract infection and/or retention\par We reviewed the urine dipstick showed moderate blood, and we'll send off for urinalysis and culture.

## 2020-12-14 NOTE — PHYSICAL EXAM
[Chaperone Present] : A chaperone was present in the examining room during all aspects of the physical examination [No Acute Distress] : in no acute distress [Well developed] : well developed [Well Nourished] : ~L well nourished [Oriented x3] : oriented to person, place, and time [Respirations regular] : ~T respiratory rate was regular [No Edema] : ~T edema was not present [Warm and Dry] : was warm and dry to touch [Normal Gait] : gait was normal [Normal] : normal external genitalia [Vulvar Atrophy] : vulvar atrophy [Normal Appearance] : general appearance was normal [Atrophy] : atrophy [Absent] : absent [Uterine Adnexae] : were not tender and not enlarged [Post Void Residual ____ml] : post void residual was [unfilled] ml [Mass (___ Cm)] : no ~M [unfilled] abdominal mass was palpated [Tenderness] : ~T no ~M abdominal tenderness observed [Distended] : not distended [Inguinal LAD] : no adenopathy was noted in the inguinal lymph nodes

## 2020-12-14 NOTE — HISTORY OF PRESENT ILLNESS
[Vaginal Wall Prolapse] : no [Rectal Prolapse] : no [Unable To Restrain Bowel Movement] : no [Urinary Tract Infection] : no [x1] : nocturia once nightly [] : months ago [de-identified] : occasionally  [de-identified] : 3 [de-identified] : every hour  [de-identified] : few [de-identified] : has episodes of urge incontinence with nocturia [de-identified] : few [de-identified] : daily, running water causes urgency [de-identified] : few [FreeTextEntry1] : ROS as per questionnaire.\par

## 2020-12-14 NOTE — DISCUSSION/SUMMARY
[FreeTextEntry1] :  I reviewed the above with the patient. We discussed likely overactive bladder for her urinary complaints. We discussed first line therapies, including Kegel's exercises and behavioral modification. We discussed medications. We also  discussed second and third line therapies, including the InterStim procedure, Botox, and PTNS. She was like to start with behavioral modification. [facility]GA  patient information on overactive bladder, kegels instructions, and  and three-day urolog was given to her. I have asked her to followup in the office in approximately 6 weeks and complete the urolog for that visit. All questions were answered.\par

## 2020-12-15 LAB
APPEARANCE: CLEAR
BACTERIA: NEGATIVE
BILIRUB UR QL STRIP: NORMAL
BILIRUBIN URINE: NEGATIVE
BLOOD URINE: NORMAL
CLARITY UR: CLEAR
COLLECTION METHOD: NORMAL
COLOR: COLORLESS
GLUCOSE QUALITATIVE U: NEGATIVE
GLUCOSE UR-MCNC: NORMAL
HCG UR QL: 0.2 EU/DL
HGB UR QL STRIP.AUTO: NORMAL
HYALINE CASTS: 0 /LPF
KETONES UR-MCNC: NORMAL
KETONES URINE: NEGATIVE
LEUKOCYTE ESTERASE UR QL STRIP: NORMAL
LEUKOCYTE ESTERASE URINE: NEGATIVE
MICROSCOPIC-UA: NORMAL
NITRITE UR QL STRIP: NORMAL
NITRITE URINE: NEGATIVE
PH UR STRIP: 6.5
PH URINE: 6.5
PROT UR STRIP-MCNC: NORMAL
PROTEIN URINE: NEGATIVE
RED BLOOD CELLS URINE: 0 /HPF
SP GR UR STRIP: 1.01
SPECIFIC GRAVITY URINE: 1.01
SQUAMOUS EPITHELIAL CELLS: 1 /HPF
UROBILINOGEN URINE: NORMAL
WHITE BLOOD CELLS URINE: 0 /HPF

## 2020-12-16 LAB — BACTERIA UR CULT: NORMAL

## 2021-01-04 ENCOUNTER — RX RENEWAL (OUTPATIENT)
Age: 66
End: 2021-01-04

## 2021-01-29 ENCOUNTER — APPOINTMENT (OUTPATIENT)
Dept: UROGYNECOLOGY | Facility: CLINIC | Age: 66
End: 2021-01-29

## 2021-06-08 ENCOUNTER — LABORATORY RESULT (OUTPATIENT)
Age: 66
End: 2021-06-08

## 2021-06-09 ENCOUNTER — APPOINTMENT (OUTPATIENT)
Dept: INTERNAL MEDICINE | Facility: CLINIC | Age: 66
End: 2021-06-09
Payer: MEDICAID

## 2021-06-09 ENCOUNTER — OUTPATIENT (OUTPATIENT)
Dept: OUTPATIENT SERVICES | Facility: HOSPITAL | Age: 66
LOS: 1 days | End: 2021-06-09
Payer: MEDICAID

## 2021-06-09 VITALS
SYSTOLIC BLOOD PRESSURE: 138 MMHG | BODY MASS INDEX: 39.65 KG/M2 | HEART RATE: 88 BPM | OXYGEN SATURATION: 96 % | DIASTOLIC BLOOD PRESSURE: 90 MMHG | HEIGHT: 61 IN | WEIGHT: 210 LBS

## 2021-06-09 DIAGNOSIS — Z98.89 OTHER SPECIFIED POSTPROCEDURAL STATES: Chronic | ICD-10-CM

## 2021-06-09 DIAGNOSIS — Z90.710 ACQUIRED ABSENCE OF BOTH CERVIX AND UTERUS: Chronic | ICD-10-CM

## 2021-06-09 DIAGNOSIS — Z98.51 TUBAL LIGATION STATUS: Chronic | ICD-10-CM

## 2021-06-09 DIAGNOSIS — I10 ESSENTIAL (PRIMARY) HYPERTENSION: ICD-10-CM

## 2021-06-09 PROCEDURE — G0463: CPT

## 2021-06-09 PROCEDURE — 83036 HEMOGLOBIN GLYCOSYLATED A1C: CPT

## 2021-06-09 PROCEDURE — 80053 COMPREHEN METABOLIC PANEL: CPT

## 2021-06-09 PROCEDURE — 80061 LIPID PANEL: CPT

## 2021-06-09 PROCEDURE — 99213 OFFICE O/P EST LOW 20 MIN: CPT | Mod: GE

## 2021-06-09 PROCEDURE — 84443 ASSAY THYROID STIM HORMONE: CPT

## 2021-06-09 PROCEDURE — 85027 COMPLETE CBC AUTOMATED: CPT

## 2021-06-09 RX ORDER — FLUTICASONE PROPIONATE 50 UG/1
50 SPRAY, METERED NASAL
Qty: 48 | Refills: 3 | Status: DISCONTINUED | COMMUNITY
Start: 2020-01-15 | End: 2021-06-09

## 2021-06-10 LAB
A1C WITH ESTIMATED AVERAGE GLUCOSE RESULT: 6 % — HIGH (ref 4–5.6)
ALBUMIN SERPL ELPH-MCNC: 4.2 G/DL — SIGNIFICANT CHANGE UP (ref 3.3–5)
ALP SERPL-CCNC: 91 U/L — SIGNIFICANT CHANGE UP (ref 40–120)
ALT FLD-CCNC: 28 U/L — SIGNIFICANT CHANGE UP (ref 10–45)
ANION GAP SERPL CALC-SCNC: 14 MMOL/L — SIGNIFICANT CHANGE UP (ref 5–17)
AST SERPL-CCNC: 35 U/L — SIGNIFICANT CHANGE UP (ref 10–40)
BILIRUB SERPL-MCNC: 0.3 MG/DL — SIGNIFICANT CHANGE UP (ref 0.2–1.2)
BUN SERPL-MCNC: 19 MG/DL — SIGNIFICANT CHANGE UP (ref 7–23)
CALCIUM SERPL-MCNC: 9.8 MG/DL — SIGNIFICANT CHANGE UP (ref 8.4–10.5)
CHLORIDE SERPL-SCNC: 105 MMOL/L — SIGNIFICANT CHANGE UP (ref 96–108)
CHOLEST SERPL-MCNC: 160 MG/DL — SIGNIFICANT CHANGE UP
CO2 SERPL-SCNC: 21 MMOL/L — LOW (ref 22–31)
CREAT SERPL-MCNC: 1.12 MG/DL — SIGNIFICANT CHANGE UP (ref 0.5–1.3)
ESTIMATED AVERAGE GLUCOSE: 126 MG/DL — HIGH (ref 68–114)
GLUCOSE SERPL-MCNC: 133 MG/DL — HIGH (ref 70–99)
HCT VFR BLD CALC: 43.2 % — SIGNIFICANT CHANGE UP (ref 34.5–45)
HDLC SERPL-MCNC: 57 MG/DL — SIGNIFICANT CHANGE UP
HGB BLD-MCNC: 13.3 G/DL — SIGNIFICANT CHANGE UP (ref 11.5–15.5)
LIPID PNL WITH DIRECT LDL SERPL: 83 MG/DL — SIGNIFICANT CHANGE UP
MCHC RBC-ENTMCNC: 29 PG — SIGNIFICANT CHANGE UP (ref 27–34)
MCHC RBC-ENTMCNC: 30.8 GM/DL — LOW (ref 32–36)
MCV RBC AUTO: 94.1 FL — SIGNIFICANT CHANGE UP (ref 80–100)
NON HDL CHOLESTEROL: 103 MG/DL — SIGNIFICANT CHANGE UP
PLATELET # BLD AUTO: 226 K/UL — SIGNIFICANT CHANGE UP (ref 150–400)
POTASSIUM SERPL-MCNC: 4.7 MMOL/L — SIGNIFICANT CHANGE UP (ref 3.5–5.3)
POTASSIUM SERPL-SCNC: 4.7 MMOL/L — SIGNIFICANT CHANGE UP (ref 3.5–5.3)
PROT SERPL-MCNC: 7.2 G/DL — SIGNIFICANT CHANGE UP (ref 6–8.3)
RBC # BLD: 4.59 M/UL — SIGNIFICANT CHANGE UP (ref 3.8–5.2)
RBC # FLD: 13.2 % — SIGNIFICANT CHANGE UP (ref 10.3–14.5)
SODIUM SERPL-SCNC: 141 MMOL/L — SIGNIFICANT CHANGE UP (ref 135–145)
T4 FREE+ TSH PNL SERPL: 0.81 UIU/ML — SIGNIFICANT CHANGE UP (ref 0.27–4.2)
TRIGL SERPL-MCNC: 101 MG/DL — SIGNIFICANT CHANGE UP
WBC # BLD: 6.37 K/UL — SIGNIFICANT CHANGE UP (ref 3.8–10.5)
WBC # FLD AUTO: 6.37 K/UL — SIGNIFICANT CHANGE UP (ref 3.8–10.5)

## 2021-06-11 ENCOUNTER — NON-APPOINTMENT (OUTPATIENT)
Age: 66
End: 2021-06-11

## 2021-06-14 NOTE — PHYSICAL EXAM
[Grossly Normal Strength/Tone] : grossly normal strength/tone [Normal] : normal gait, coordination grossly intact, no focal deficits and deep tendon reflexes were 2+ and symmetric [de-identified] : Mild swelling on medial malleolus with some TTP. No tenderness in other joints of L foot. No cuts, bruises, skin discoloration. Slight limp during ambulation with shifting of weight to right side

## 2021-06-14 NOTE — HISTORY OF PRESENT ILLNESS
[FreeTextEntry8] : 65 F with hx of GERD, HLD, chronic sinusitis, overactive bladder w/ urge incontinence presenting to clinic for ankle pain. Patient slipped and fell in Feb 2021 and twisted her L ankle. Per pt, she had a hx of L ankle fracture twice (first time 15 yrs ago, and another time 5 yrs after that). Since twisting her ankle, patient has not seen a doctor or had any imaging done. Right after event, patient felt severe pain and swelling but the pain eventually decreased, but never fully went away. Pain would worsen after prolonged standing and would improve with rest and warm compresses. Patient also had some associated shooting pains up her L lower leg anteriorly. Patient has to limp when she walks. No knee pain or hip pain. No pain on R side. Denies weakness, fevers, chills, recent illness. Patient has tried ice, banding, massage, and advil with some improvement in pain. Patient does not have an orthopedic doctor that she follows. \par \par \par \par

## 2021-06-14 NOTE — PLAN
[FreeTextEntry1] : \par 65 F with hx of GERD, HLD, chronic sinusitis, overactive bladder with urge incontinence, obesity, presenting to clinic with complaints of ankle pain c/f ankle strain vs poorly healed fracture \par \par # Ankle pain\par - Possible ankle strain given improving pain although shooting pains suggests there might be some underlying nerve impingement. Less concerned for acute fracture but cannot rule out poor fracture healing/bony compromise.\par - Ordered L ankle x ray\par - Orthopedic referral given to evaluate ankle given hx of multiple fractures in same area. Advised patient to obtain Xray first before scheduling appointment\par - Also ordered DEXA scan to evaluate bone density\par - Supportive pain management with compresses, rest, massage, and advil prn\par - Consider physical therapy and/or further imaging if Xray negative \par \par # HCM\par - Patient needs CPE. Will obtain labs now so that they are available for her CPE visit (CBC, CMP, lipid, TSH, A1C)\par - Informed patient to make appointment for CPE at her earliest convenience\par - Educated patient on weight management. Informed her that her weight could potentially worsen her symptoms. Can address weight management at CPE\par - Pt due for her mammo, give referral at CPE\par - Patient received both doses of Pfizer vaccine (in March 2021)\par - RTC for CPE\par \par Discussed with Dr. Mejia\par \par Billy Bragg, PGY-1

## 2021-06-14 NOTE — REVIEW OF SYSTEMS
[Joint Pain] : joint pain [Joint Swelling] : joint swelling [Negative] : Neurological [Joint Stiffness] : no joint stiffness [Muscle Weakness] : no muscle weakness [Muscle Pain] : no muscle pain [Back Pain] : no back pain

## 2021-06-17 ENCOUNTER — APPOINTMENT (OUTPATIENT)
Dept: RADIOLOGY | Facility: IMAGING CENTER | Age: 66
End: 2021-06-17
Payer: MEDICAID

## 2021-06-17 ENCOUNTER — RESULT REVIEW (OUTPATIENT)
Age: 66
End: 2021-06-17

## 2021-06-17 ENCOUNTER — OUTPATIENT (OUTPATIENT)
Dept: OUTPATIENT SERVICES | Facility: HOSPITAL | Age: 66
LOS: 1 days | End: 2021-06-17
Payer: MEDICAID

## 2021-06-17 DIAGNOSIS — Z00.8 ENCOUNTER FOR OTHER GENERAL EXAMINATION: ICD-10-CM

## 2021-06-17 DIAGNOSIS — Z98.89 OTHER SPECIFIED POSTPROCEDURAL STATES: Chronic | ICD-10-CM

## 2021-06-17 DIAGNOSIS — Z90.710 ACQUIRED ABSENCE OF BOTH CERVIX AND UTERUS: Chronic | ICD-10-CM

## 2021-06-17 DIAGNOSIS — Z98.51 TUBAL LIGATION STATUS: Chronic | ICD-10-CM

## 2021-06-17 PROCEDURE — 77080 DXA BONE DENSITY AXIAL: CPT | Mod: 26

## 2021-06-17 PROCEDURE — 73610 X-RAY EXAM OF ANKLE: CPT | Mod: 26,LT

## 2021-06-17 PROCEDURE — 73610 X-RAY EXAM OF ANKLE: CPT

## 2021-06-17 PROCEDURE — 77080 DXA BONE DENSITY AXIAL: CPT

## 2021-06-18 DIAGNOSIS — M25.579 PAIN IN UNSPECIFIED ANKLE AND JOINTS OF UNSPECIFIED FOOT: ICD-10-CM

## 2021-06-21 ENCOUNTER — NON-APPOINTMENT (OUTPATIENT)
Age: 66
End: 2021-06-21

## 2021-09-10 ENCOUNTER — APPOINTMENT (OUTPATIENT)
Dept: INTERNAL MEDICINE | Facility: CLINIC | Age: 66
End: 2021-09-10

## 2021-10-06 ENCOUNTER — OUTPATIENT (OUTPATIENT)
Dept: OUTPATIENT SERVICES | Facility: HOSPITAL | Age: 66
LOS: 1 days | End: 2021-10-06
Payer: MEDICAID

## 2021-10-06 ENCOUNTER — APPOINTMENT (OUTPATIENT)
Dept: ORTHOPEDIC SURGERY | Facility: HOSPITAL | Age: 66
End: 2021-10-06

## 2021-10-06 VITALS
HEART RATE: 65 BPM | WEIGHT: 209 LBS | DIASTOLIC BLOOD PRESSURE: 87 MMHG | SYSTOLIC BLOOD PRESSURE: 128 MMHG | HEIGHT: 61 IN | BODY MASS INDEX: 39.46 KG/M2 | TEMPERATURE: 96.1 F

## 2021-10-06 DIAGNOSIS — Z98.51 TUBAL LIGATION STATUS: Chronic | ICD-10-CM

## 2021-10-06 DIAGNOSIS — Z98.89 OTHER SPECIFIED POSTPROCEDURAL STATES: Chronic | ICD-10-CM

## 2021-10-06 DIAGNOSIS — M25.579 PAIN IN UNSPECIFIED ANKLE AND JOINTS OF UNSPECIFIED FOOT: ICD-10-CM

## 2021-10-06 DIAGNOSIS — M79.609 PAIN IN UNSPECIFIED LIMB: ICD-10-CM

## 2021-10-06 DIAGNOSIS — Z90.710 ACQUIRED ABSENCE OF BOTH CERVIX AND UTERUS: Chronic | ICD-10-CM

## 2021-10-06 PROCEDURE — G0463: CPT

## 2021-10-07 PROBLEM — M25.579 ANKLE PAIN: Status: ACTIVE | Noted: 2021-06-09

## 2021-10-08 DIAGNOSIS — M25.579 PAIN IN UNSPECIFIED ANKLE AND JOINTS OF UNSPECIFIED FOOT: ICD-10-CM

## 2021-11-09 NOTE — ED ADULT NURSE NOTE - NS ED NURSE RECORD ANOTHER VITAL SIGN
Vascular lab preliminary    Right lower extremity venous duplex exam is complete, bedside in PACU      Right leg is positive for a small isolated area of acute DVT in one of the gastroc veins.      Spoke with  at 11:39am about findings      Final report to follow      
Yes

## 2022-01-03 ENCOUNTER — RX RENEWAL (OUTPATIENT)
Age: 67
End: 2022-01-03

## 2022-01-04 ENCOUNTER — NON-APPOINTMENT (OUTPATIENT)
Age: 67
End: 2022-01-04

## 2022-01-20 ENCOUNTER — APPOINTMENT (OUTPATIENT)
Dept: INTERNAL MEDICINE | Facility: CLINIC | Age: 67
End: 2022-01-20

## 2022-01-25 ENCOUNTER — NON-APPOINTMENT (OUTPATIENT)
Age: 67
End: 2022-01-25

## 2022-01-26 ENCOUNTER — OUTPATIENT (OUTPATIENT)
Dept: OUTPATIENT SERVICES | Facility: HOSPITAL | Age: 67
LOS: 1 days | End: 2022-01-26
Payer: MEDICAID

## 2022-01-26 ENCOUNTER — APPOINTMENT (OUTPATIENT)
Dept: INTERNAL MEDICINE | Facility: CLINIC | Age: 67
End: 2022-01-26
Payer: MEDICAID

## 2022-01-26 VITALS
HEIGHT: 61 IN | DIASTOLIC BLOOD PRESSURE: 70 MMHG | OXYGEN SATURATION: 99 % | HEART RATE: 89 BPM | BODY MASS INDEX: 39.67 KG/M2 | SYSTOLIC BLOOD PRESSURE: 118 MMHG | WEIGHT: 210.13 LBS

## 2022-01-26 DIAGNOSIS — Z90.710 ACQUIRED ABSENCE OF BOTH CERVIX AND UTERUS: Chronic | ICD-10-CM

## 2022-01-26 DIAGNOSIS — I10 ESSENTIAL (PRIMARY) HYPERTENSION: ICD-10-CM

## 2022-01-26 DIAGNOSIS — N32.81 OVERACTIVE BLADDER: ICD-10-CM

## 2022-01-26 DIAGNOSIS — Z98.89 OTHER SPECIFIED POSTPROCEDURAL STATES: Chronic | ICD-10-CM

## 2022-01-26 DIAGNOSIS — H04.202 UNSPECIFIED EPIPHORA, LEFT SIDE: ICD-10-CM

## 2022-01-26 DIAGNOSIS — Z98.51 TUBAL LIGATION STATUS: Chronic | ICD-10-CM

## 2022-01-26 LAB — HBA1C MFR BLD HPLC: 5.9

## 2022-01-26 PROCEDURE — 99396 PREV VISIT EST AGE 40-64: CPT | Mod: GC

## 2022-01-26 PROCEDURE — G0463: CPT | Mod: 25

## 2022-01-26 PROCEDURE — 83036 HEMOGLOBIN GLYCOSYLATED A1C: CPT

## 2022-01-26 RX ORDER — MELOXICAM 7.5 MG/1
7.5 TABLET ORAL DAILY
Qty: 14 | Refills: 0 | Status: DISCONTINUED | COMMUNITY
Start: 2021-10-06 | End: 2022-01-26

## 2022-02-01 NOTE — PHYSICAL EXAM
[No Acute Distress] : no acute distress [Well Developed] : well developed [Normal Sclera/Conjunctiva] : normal sclera/conjunctiva [PERRL] : pupils equal round and reactive to light [EOMI] : extraocular movements intact [Normal Outer Ear/Nose] : the outer ears and nose were normal in appearance [Supple] : supple [No Respiratory Distress] : no respiratory distress  [Clear to Auscultation] : lungs were clear to auscultation bilaterally [Normal Rate] : normal rate  [Regular Rhythm] : with a regular rhythm [Normal S1, S2] : normal S1 and S2 [No Edema] : there was no peripheral edema [Grossly Normal Strength/Tone] : grossly normal strength/tone [No Rash] : no rash [No Focal Deficits] : no focal deficits [Normal Affect] : the affect was normal [Normal Insight/Judgement] : insight and judgment were intact

## 2022-02-02 ENCOUNTER — RESULT REVIEW (OUTPATIENT)
Age: 67
End: 2022-02-02

## 2022-02-02 ENCOUNTER — OUTPATIENT (OUTPATIENT)
Dept: OUTPATIENT SERVICES | Facility: HOSPITAL | Age: 67
LOS: 1 days | End: 2022-02-02
Payer: MEDICAID

## 2022-02-02 ENCOUNTER — APPOINTMENT (OUTPATIENT)
Dept: MAMMOGRAPHY | Facility: IMAGING CENTER | Age: 67
End: 2022-02-02
Payer: MEDICAID

## 2022-02-02 ENCOUNTER — APPOINTMENT (OUTPATIENT)
Dept: RADIOLOGY | Facility: IMAGING CENTER | Age: 67
End: 2022-02-02
Payer: MEDICAID

## 2022-02-02 DIAGNOSIS — Z98.89 OTHER SPECIFIED POSTPROCEDURAL STATES: Chronic | ICD-10-CM

## 2022-02-02 DIAGNOSIS — Z90.710 ACQUIRED ABSENCE OF BOTH CERVIX AND UTERUS: Chronic | ICD-10-CM

## 2022-02-02 DIAGNOSIS — Z98.51 TUBAL LIGATION STATUS: Chronic | ICD-10-CM

## 2022-02-02 DIAGNOSIS — Z00.8 ENCOUNTER FOR OTHER GENERAL EXAMINATION: ICD-10-CM

## 2022-02-02 PROCEDURE — 77063 BREAST TOMOSYNTHESIS BI: CPT

## 2022-02-02 PROCEDURE — 77067 SCR MAMMO BI INCL CAD: CPT | Mod: 26

## 2022-02-02 PROCEDURE — 77063 BREAST TOMOSYNTHESIS BI: CPT | Mod: 26

## 2022-02-02 PROCEDURE — 77067 SCR MAMMO BI INCL CAD: CPT

## 2022-02-02 NOTE — HEALTH RISK ASSESSMENT
[Never] : Never [Yes] : Yes [Monthly or less (1 pt)] : Monthly or less (1 point) [Never (0 pts)] : Never (0 points) [No] : In the past 12 months have you used drugs other than those required for medical reasons? No [No falls in past year] : Patient reported no falls in the past year [0] : 1) Little interest or pleasure doing things: Not at all (0) [1] : 2) Feeling down, depressed, or hopeless for several days (1) [de-identified] : Does not exercise. Plans to start stationary bike.  [de-identified] : oatmeal, rice, stew, fish, meats, noodles. Has cut down on portions

## 2022-02-02 NOTE — PLAN
[FreeTextEntry1] : 67 yo Rastafari F with a PMH GERD, HLD, and chronic sinusitis, overactive bladder w/ urge incontinence who presents for a CPE.\par \par #HLD\par - changed dose from 80 to 40mg Atorvastatin daily\par \par #GERD\par - c/w diet regimen\par \par #L eye tearing\par - pt requesting opthalmology referral\par \par HCM\par - DEXA referral given\par - mammogram referral given\par - COVID vaccinated x3 (Dx late Dec 2021 with COVID)\par - Flu vaccine - declined today\par - cervical cancer- hysterectomy

## 2022-02-02 NOTE — HISTORY OF PRESENT ILLNESS
[FreeTextEntry1] : CPE [de-identified] : 65 yo Religion F with a PMH GERD, HLD, and chronic sinusitis,  overactive bladder w/ urge incontinence who presents for a CPE. \par \par # HLD\par Continues to take atorvastatin 80mg daily. Pt stats she intermittent fasts; diet consist of oatmeal, rice, stew, fish, meats, noodles. Has cut down on portions. \par \par #GERD\par Decreased spicy and greasy food intake. Pain sporadically starts if she consumes greasy meals. Abdominal pain occurs 2-3x/week. States she takes protonix every other day. \par \par #Increased tearing of L eye-\par After COVID in January started experiencing L eye tearing, burning sensation. Mild however, feels unusual..No green, yellow discharge.\par  \par Unemployed\par Lives in [place]. Lives with . \par \par #HCM\par - COVID vaccinated x3 (Dx late Dec 2021 with COVID)\par - Flu vaccine - declined today\par - Mammogram - 2019\par - cervical cancer- hysterectomy

## 2022-02-02 NOTE — REVIEW OF SYSTEMS
[Vision Problems] : vision problems [Abdominal Pain] : abdominal pain [Heartburn] : heartburn [Negative] : Heme/Lymph [Discharge] : no discharge [Pain] : no pain [Itching] : no itching [Nausea] : no nausea [Constipation] : no constipation [Vomiting] : no vomiting [Melena] : no melena [Muscle Pain] : no muscle pain [Back Pain] : no back pain [FreeTextEntry9] : + ankle pain (Left)

## 2022-02-09 DIAGNOSIS — Z00.00 ENCOUNTER FOR GENERAL ADULT MEDICAL EXAMINATION WITHOUT ABNORMAL FINDINGS: ICD-10-CM

## 2022-02-10 PROBLEM — I10 HYPERTENSION: Status: ACTIVE | Noted: 2019-10-23

## 2022-02-10 PROBLEM — N32.81 OVERACTIVE BLADDER: Status: ACTIVE | Noted: 2020-12-14

## 2022-05-17 ENCOUNTER — NON-APPOINTMENT (OUTPATIENT)
Age: 67
End: 2022-05-17

## 2022-05-17 ENCOUNTER — APPOINTMENT (OUTPATIENT)
Dept: OPHTHALMOLOGY | Facility: CLINIC | Age: 67
End: 2022-05-17
Payer: MEDICAID

## 2022-05-17 PROCEDURE — 92015 DETERMINE REFRACTIVE STATE: CPT | Mod: NC

## 2022-05-17 PROCEDURE — 92004 COMPRE OPH EXAM NEW PT 1/>: CPT

## 2022-08-19 ENCOUNTER — NON-APPOINTMENT (OUTPATIENT)
Age: 67
End: 2022-08-19

## 2022-08-19 ENCOUNTER — APPOINTMENT (OUTPATIENT)
Dept: OPHTHALMOLOGY | Facility: CLINIC | Age: 67
End: 2022-08-19

## 2022-08-19 PROCEDURE — 92134 CPTRZ OPH DX IMG PST SGM RTA: CPT

## 2022-08-19 PROCEDURE — 92012 INTRM OPH EXAM EST PATIENT: CPT

## 2022-09-19 NOTE — ED ADULT NURSE NOTE - NS ED NURSE LEVEL OF CONSCIOUSNESS MENTAL STATUS
Awake/Alert/Cooperative Cyclophosphamide Pregnancy And Lactation Text: This medication is Pregnancy Category D and it isn't considered safe during pregnancy. This medication is excreted in breast milk.

## 2022-09-30 ENCOUNTER — APPOINTMENT (OUTPATIENT)
Dept: INTERNAL MEDICINE | Facility: CLINIC | Age: 67
End: 2022-09-30

## 2022-09-30 ENCOUNTER — OUTPATIENT (OUTPATIENT)
Dept: OUTPATIENT SERVICES | Facility: HOSPITAL | Age: 67
LOS: 1 days | End: 2022-09-30
Payer: COMMERCIAL

## 2022-09-30 VITALS
OXYGEN SATURATION: 98 % | SYSTOLIC BLOOD PRESSURE: 122 MMHG | HEIGHT: 61 IN | BODY MASS INDEX: 39.84 KG/M2 | WEIGHT: 211 LBS | DIASTOLIC BLOOD PRESSURE: 78 MMHG | HEART RATE: 68 BPM

## 2022-09-30 DIAGNOSIS — Z98.51 TUBAL LIGATION STATUS: Chronic | ICD-10-CM

## 2022-09-30 DIAGNOSIS — Z98.89 OTHER SPECIFIED POSTPROCEDURAL STATES: Chronic | ICD-10-CM

## 2022-09-30 DIAGNOSIS — H91.91 UNSPECIFIED HEARING LOSS, RIGHT EAR: ICD-10-CM

## 2022-09-30 DIAGNOSIS — Z90.710 ACQUIRED ABSENCE OF BOTH CERVIX AND UTERUS: Chronic | ICD-10-CM

## 2022-09-30 DIAGNOSIS — M19.90 UNSPECIFIED OSTEOARTHRITIS, UNSPECIFIED SITE: ICD-10-CM

## 2022-09-30 DIAGNOSIS — I10 ESSENTIAL (PRIMARY) HYPERTENSION: ICD-10-CM

## 2022-09-30 PROCEDURE — 99213 OFFICE O/P EST LOW 20 MIN: CPT | Mod: GE

## 2022-09-30 PROCEDURE — G0463: CPT | Mod: 25

## 2022-09-30 PROCEDURE — G0008: CPT

## 2022-09-30 PROCEDURE — 90688 IIV4 VACCINE SPLT 0.5 ML IM: CPT

## 2022-09-30 NOTE — PHYSICAL EXAM
[No Acute Distress] : no acute distress [Well Nourished] : well nourished [Well Developed] : well developed [Well-Appearing] : well-appearing [Normal Sclera/Conjunctiva] : normal sclera/conjunctiva [PERRL] : pupils equal round and reactive to light [EOMI] : extraocular movements intact [Normal Outer Ear/Nose] : the outer ears and nose were normal in appearance [Normal Oropharynx] : the oropharynx was normal [No JVD] : no jugular venous distention [No Lymphadenopathy] : no lymphadenopathy [Supple] : supple [Thyroid Normal, No Nodules] : the thyroid was normal and there were no nodules present [No Respiratory Distress] : no respiratory distress  [No Accessory Muscle Use] : no accessory muscle use [Clear to Auscultation] : lungs were clear to auscultation bilaterally [Normal Rate] : normal rate  [Regular Rhythm] : with a regular rhythm [Normal S1, S2] : normal S1 and S2 [No Murmur] : no murmur heard [No Carotid Bruits] : no carotid bruits [No Abdominal Bruit] : a ~M bruit was not heard ~T in the abdomen [No Varicosities] : no varicosities [Pedal Pulses Present] : the pedal pulses are present [No Edema] : there was no peripheral edema [No Palpable Aorta] : no palpable aorta [No Extremity Clubbing/Cyanosis] : no extremity clubbing/cyanosis [Soft] : abdomen soft [Non Tender] : non-tender [Non-distended] : non-distended [No Masses] : no abdominal mass palpated [No HSM] : no HSM [Normal Bowel Sounds] : normal bowel sounds [Normal Posterior Cervical Nodes] : no posterior cervical lymphadenopathy [Normal Anterior Cervical Nodes] : no anterior cervical lymphadenopathy [No CVA Tenderness] : no CVA  tenderness [No Spinal Tenderness] : no spinal tenderness [No Joint Swelling] : no joint swelling [Grossly Normal Strength/Tone] : grossly normal strength/tone [No Rash] : no rash [Coordination Grossly Intact] : coordination grossly intact [No Focal Deficits] : no focal deficits [Normal Gait] : normal gait [Deep Tendon Reflexes (DTR)] : deep tendon reflexes were 2+ and symmetric [Normal Affect] : the affect was normal [Normal Insight/Judgement] : insight and judgment were intact [de-identified] : Mildly sclerotic TM in right ear  [de-identified] : upper and lower extremity range of motion intact bilaterally, strength intact in all extremities, Paulson test negative, no joint swelling or redness, anterior and posterior drawer test negative

## 2022-09-30 NOTE — HISTORY OF PRESENT ILLNESS
[FreeTextEntry1] : Follow-up  [de-identified] : 68 yo F with PMHx of GERD, hyperlipidemia, sinusitis, and urge incontinence here for right knee and shoulder pain and tinnitus. Patient reports right knee and shoulder pain, started 2-3 weeks ago, associated with weakness on elevation. Patient denies recent heavy lifting, reports pain is episodic, 10/10 at worst, improved with tylenol, and worse at night after vigorous activity. Patient denies pain in the morning. Patient also reports burning sensation in right knee, worse at night. \par Patient also reports GERD symptoms are persistent. Patient reports pain after eating spicy food, associated with nausea and vomiting. Patient reports not eating fried food. Patient is currently taking pantoprazole 40 mg, not every day (at most 3x/week). \par Patient also reports difficulty hearing, associated with ringing sound, only on right side. Patient denies headache, dizziness.

## 2022-09-30 NOTE — REVIEW OF SYSTEMS
[Hearing Loss] : hearing loss [Joint Pain] : joint pain [Joint Stiffness] : joint stiffness [Negative] : Heme/Lymph

## 2022-10-05 DIAGNOSIS — M19.90 UNSPECIFIED OSTEOARTHRITIS, UNSPECIFIED SITE: ICD-10-CM

## 2022-10-05 DIAGNOSIS — K21.9 GASTRO-ESOPHAGEAL REFLUX DISEASE WITHOUT ESOPHAGITIS: ICD-10-CM

## 2022-10-05 DIAGNOSIS — H91.91 UNSPECIFIED HEARING LOSS, RIGHT EAR: ICD-10-CM

## 2022-10-05 DIAGNOSIS — Z23 ENCOUNTER FOR IMMUNIZATION: ICD-10-CM

## 2022-11-10 NOTE — ED ADULT NURSE NOTE - CCCP TRG CHIEF CMPLNT
[facility] Care Transitions Follow Up Call        Patient: Aidee Hays  Patient : 1928   MRN: [id number]  Reason for Admission: chest pain  Discharge Date: 10/28/22 RARS: Readmission Risk Score: 15.4    -Second attempt to speak with the patient for subsequent Care Transition call. Message left with CTN's contact information requesting return phone call. -CTN to sign off.          Follow Up  Future Appointments   Date Time Provider Antonia Restrepo   2022 11:30 AM Kerrie Franklin MD [facility]   2023  2:00 PM Kerrie Franklin MD [facility]                Tiffany New RN
fever

## 2022-12-05 ENCOUNTER — OUTPATIENT (OUTPATIENT)
Dept: OUTPATIENT SERVICES | Facility: HOSPITAL | Age: 67
LOS: 1 days | Discharge: HOME | End: 2022-12-05

## 2022-12-05 ENCOUNTER — APPOINTMENT (OUTPATIENT)
Dept: OTOLARYNGOLOGY | Facility: CLINIC | Age: 67
End: 2022-12-05

## 2022-12-05 VITALS — WEIGHT: 211 LBS | BODY MASS INDEX: 39.84 KG/M2 | HEIGHT: 61 IN

## 2022-12-05 DIAGNOSIS — H93.293 OTHER ABNORMAL AUDITORY PERCEPTIONS, BILATERAL: ICD-10-CM

## 2022-12-05 DIAGNOSIS — H69.83 OTHER SPECIFIED DISORDERS OF EUSTACHIAN TUBE, BILATERAL: ICD-10-CM

## 2022-12-05 DIAGNOSIS — H90.3 SENSORINEURAL HEARING LOSS, BILATERAL: ICD-10-CM

## 2022-12-05 DIAGNOSIS — Z90.710 ACQUIRED ABSENCE OF BOTH CERVIX AND UTERUS: Chronic | ICD-10-CM

## 2022-12-05 DIAGNOSIS — Z98.89 OTHER SPECIFIED POSTPROCEDURAL STATES: Chronic | ICD-10-CM

## 2022-12-05 DIAGNOSIS — Z98.51 TUBAL LIGATION STATUS: Chronic | ICD-10-CM

## 2022-12-05 DIAGNOSIS — H93.13 TINNITUS, BILATERAL: ICD-10-CM

## 2022-12-05 PROCEDURE — 99204 OFFICE O/P NEW MOD 45 MIN: CPT

## 2022-12-05 PROCEDURE — 92557 COMPREHENSIVE HEARING TEST: CPT

## 2022-12-05 PROCEDURE — 92567 TYMPANOMETRY: CPT

## 2022-12-08 PROBLEM — H69.83 DYSFUNCTION OF BOTH EUSTACHIAN TUBES: Status: ACTIVE | Noted: 2022-12-05

## 2022-12-08 PROBLEM — H93.293 OTHER ABNORMAL AUDITORY PERCEPTIONS, BILATERAL: Status: ACTIVE | Noted: 2022-12-08

## 2022-12-08 NOTE — REASON FOR VISIT
[Initial Consultation] : an initial consultation for [FreeTextEntry2] : referred by PCP to follow up for ringing and hearing loss in both ears R>L started 2 months ago

## 2022-12-08 NOTE — DATA REVIEWED
[de-identified] : An audiogram was ordered and performed including pure tones, tympanometry and speech testing for the patients complaint of hearing loss\par I have independently reviewed the patient's audiogram from today and my findings include \par AU Hearing -1k hz sloping to mild CHL 2k-4k hz sloping to severe- profound HL. AU Tymp A. Tinnitus Match 8k hz @ 88db

## 2022-12-08 NOTE — HISTORY OF PRESENT ILLNESS
[de-identified] : 67Y F present for bilateral hearing loss for last 2 months\par Alda has been telling her that she has had hearing loss for 1 years\par Patient states she also bilateral tinnitus for past 2 months as well\par Uses Q-tip\par Family history of hearing loss: Father has hearing loss 40s, Mother has 70s\par Denies history of ear surgeries. \par No history of head/otologic trauma, no chemo therapy, IV antibiotics or ototoxins. \par Denies loud noise exposure. \par Last audiogram conducted 10 years ago\par Patient denies cat scan or MRI\par Patient denies otalgia, otorrhea, dizziness. Statement Selected

## 2022-12-08 NOTE — ASSESSMENT
[FreeTextEntry1] : 67Y F bilateral tinnitus 2/2 Bilateral SNHL with mild CHL. Physical exam shows normal ear bilateral EAC/TM.\par \par Personally reviewed audiogram showing AU Hearing -1k hz sloping to mild CHL 2k-4k hz sloping to severe- profound HL. AU Tymp A. Tinnitus Match 8k hz @ 88db\par \par Recommend:\par Bilateral  Tinnitus \par -Discussed that Hearing Aids may help with relieving some of the tinnitus. Tinnitus usually follows the same pattern of hearing loss and can be attributed to phantom sounds in the brain filling in for the loss of hearing in those particular frequencies\par -Discussed that Tinnitus usually can be attributed to phantom sounds in the brain filling in for sounds. If the tinnitus is brief only last for a few seconds with no loss of hearing associated. It is usually physiological and can be management conservatively \par -Discussed notched therapy, masking therapy, cognitive behavioral therapy, factors that may influence tinnitus, and discussed limited benefit of tinnitus supplements.\par -Tinnitus Hand Out Given\par -Patient states will try white noise machine\par \par SNHL\par -Discussed Benefit of Hearings Aids and their value of helping keep brain stimulated by helping hear conversation which keeps a person active and socially connected. Stressed also the association with a lower risk of incident dementia and slower cognitive decline.\par -Clearance Hearing Aid Evaluation Given\par \par Eustachian Tube Dysfunction\par - Discussed with patient that the Eustachian Tubes run between the inside of the ears and the back of the nose. They keep air pressure stable in the ears. If your eustachian tubes become blocked, the air pressure in your ears changes. Fluid or inflammation from an recent illness can clog eustachian tubes, causing pain in the ears. A quick change in air pressure can cause eustachian tubes to close up. This might happen when an airplane changes altitude or when a  goes up or down underwater.\par - Send to Pharmacy trial of Flonase daily can decrease inflammation in the posterior portion of the nasal pharynx to allow a better chance of the eustachian tube to open.\par -Trial OTC Otovent Device to strength Eustachian Tube\par - If symptoms do not improved over time or on the trial of Flonase can discuss surgical options\par \par -Return to clinic annually to monitor hearing loss or sooner if new/worsen symptoms present\par

## 2022-12-12 DIAGNOSIS — H90.3 SENSORINEURAL HEARING LOSS, BILATERAL: ICD-10-CM

## 2022-12-12 DIAGNOSIS — H69.83 OTHER SPECIFIED DISORDERS OF EUSTACHIAN TUBE, BILATERAL: ICD-10-CM

## 2022-12-12 DIAGNOSIS — H93.13 TINNITUS, BILATERAL: ICD-10-CM

## 2022-12-12 DIAGNOSIS — H93.293 OTHER ABNORMAL AUDITORY PERCEPTIONS, BILATERAL: ICD-10-CM

## 2023-03-31 ENCOUNTER — OUTPATIENT (OUTPATIENT)
Dept: OUTPATIENT SERVICES | Facility: HOSPITAL | Age: 68
LOS: 1 days | End: 2023-03-31
Payer: MEDICARE

## 2023-03-31 ENCOUNTER — RESULT REVIEW (OUTPATIENT)
Age: 68
End: 2023-03-31

## 2023-03-31 ENCOUNTER — APPOINTMENT (OUTPATIENT)
Dept: INTERNAL MEDICINE | Facility: CLINIC | Age: 68
End: 2023-03-31
Payer: MEDICARE

## 2023-03-31 VITALS
WEIGHT: 201 LBS | DIASTOLIC BLOOD PRESSURE: 100 MMHG | HEIGHT: 61 IN | SYSTOLIC BLOOD PRESSURE: 140 MMHG | BODY MASS INDEX: 37.95 KG/M2 | HEART RATE: 70 BPM | OXYGEN SATURATION: 98 %

## 2023-03-31 VITALS — SYSTOLIC BLOOD PRESSURE: 136 MMHG | DIASTOLIC BLOOD PRESSURE: 84 MMHG

## 2023-03-31 DIAGNOSIS — Z23 ENCOUNTER FOR IMMUNIZATION: ICD-10-CM

## 2023-03-31 DIAGNOSIS — M25.511 PAIN IN RIGHT SHOULDER: ICD-10-CM

## 2023-03-31 DIAGNOSIS — Z12.39 ENCOUNTER FOR OTHER SCREENING FOR MALIGNANT NEOPLASM OF BREAST: ICD-10-CM

## 2023-03-31 DIAGNOSIS — Z98.51 TUBAL LIGATION STATUS: Chronic | ICD-10-CM

## 2023-03-31 DIAGNOSIS — I10 ESSENTIAL (PRIMARY) HYPERTENSION: ICD-10-CM

## 2023-03-31 DIAGNOSIS — Z98.89 OTHER SPECIFIED POSTPROCEDURAL STATES: Chronic | ICD-10-CM

## 2023-03-31 DIAGNOSIS — Z90.710 ACQUIRED ABSENCE OF BOTH CERVIX AND UTERUS: Chronic | ICD-10-CM

## 2023-03-31 DIAGNOSIS — E78.5 HYPERLIPIDEMIA, UNSPECIFIED: ICD-10-CM

## 2023-03-31 DIAGNOSIS — R51.9 HEADACHE, UNSPECIFIED: ICD-10-CM

## 2023-03-31 DIAGNOSIS — M79.676 PAIN IN UNSPECIFIED TOE(S): ICD-10-CM

## 2023-03-31 DIAGNOSIS — R21 RASH AND OTHER NONSPECIFIC SKIN ERUPTION: ICD-10-CM

## 2023-03-31 PROCEDURE — 80053 COMPREHEN METABOLIC PANEL: CPT

## 2023-03-31 PROCEDURE — G0439: CPT | Mod: 25

## 2023-03-31 PROCEDURE — G0009: CPT

## 2023-03-31 PROCEDURE — 84443 ASSAY THYROID STIM HORMONE: CPT

## 2023-03-31 PROCEDURE — 80061 LIPID PANEL: CPT

## 2023-03-31 PROCEDURE — 85027 COMPLETE CBC AUTOMATED: CPT

## 2023-03-31 PROCEDURE — 99397 PER PM REEVAL EST PAT 65+ YR: CPT | Mod: GC

## 2023-03-31 PROCEDURE — 90732 PPSV23 VACC 2 YRS+ SUBQ/IM: CPT

## 2023-03-31 PROCEDURE — 83036 HEMOGLOBIN GLYCOSYLATED A1C: CPT

## 2023-04-01 DIAGNOSIS — Z00.00 ENCOUNTER FOR GENERAL ADULT MEDICAL EXAMINATION WITHOUT ABNORMAL FINDINGS: ICD-10-CM

## 2023-04-01 DIAGNOSIS — E78.5 HYPERLIPIDEMIA, UNSPECIFIED: ICD-10-CM

## 2023-04-01 DIAGNOSIS — R51.9 HEADACHE, UNSPECIFIED: ICD-10-CM

## 2023-04-01 DIAGNOSIS — Z23 ENCOUNTER FOR IMMUNIZATION: ICD-10-CM

## 2023-04-01 DIAGNOSIS — M25.511 PAIN IN RIGHT SHOULDER: ICD-10-CM

## 2023-04-01 DIAGNOSIS — K21.9 GASTRO-ESOPHAGEAL REFLUX DISEASE WITHOUT ESOPHAGITIS: ICD-10-CM

## 2023-04-01 DIAGNOSIS — Z12.39 ENCOUNTER FOR OTHER SCREENING FOR MALIGNANT NEOPLASM OF BREAST: ICD-10-CM

## 2023-04-01 NOTE — PHYSICAL EXAM
[Normal] : affect was normal and insight and judgment were intact [de-identified] : 2 cm fluctant cyst like mass at PIP of L 5th finger. Non erythematous and non tender. +Empty can test on R shoulder [de-identified] : 7 cm florecita, dry rash on mid back

## 2023-04-01 NOTE — HISTORY OF PRESENT ILLNESS
[FreeTextEntry1] : CPE [de-identified] : 66 yo F with PMHx of GERD, hyperlipidemia, sinusitis, and urge incontinence here for CPE \par \par #GERD \par Takes PPI 3-4 times a week. When symptoms do occur, they are significant. Patient often vomits and has significant burning pain. She takes her PPI in the morning on days when she anticipates having spicy foods or other triggers. Takes it morning, 30 min before she eats, empty stomach.\par \par #HLD \par Compliant with atorvastatin, takes every night without issue.  \par \par #Headaches \par 2-3 days of headache, occurs in morning. She gest the headaches about once a month. Feels fatigued, low energy.  Has not taken any medication for headaches this occurrence, but previously well controlled with tylenol. Right now pain is in back of head, prior its been other locations. Headache is improved today than it has been in prior days. \par \par  #Toe pain \par About 3 weeks ago her L pinky toe turned blue and subsequently turned red and felt pain when she wore a shoe. Denies trauma and wears shoes all the time even around the house, is never barefoot.  \par \par #R 5th finger mass\par Has been there for years but it started growing about 2 months ago. Not painful. Does not restrict movement. No drainage or redness.  \par \par #Back Rash \par Dark itchy back rash for about 2 months. Cannot see it but has her daughter look at it. Has been putting moisturizers on it which helps with the symptoms  \par \par #Shoulder Pain \par Previously sent to PT but did not go. Pain has bee present for a few months, can’t sleep on it. Unsure of how is started, denies any trauma. Pain is getting worse. Uses arm a lot in house work. Only hurts when she is relaxing and pain is worse at night. \par \par #Diet \par Eats 2 meals a day.  \par Breakfast:oatmeal or a bar and a coffee \par Dinner: eats a lot of rice, vegetables meat, fish. Rarely eats fried foods. Rarely has sugary drinks. Rare desert.  \par \par #Exercise \par Tries to exercise by walking but doesn’t really do so. Used to have stationary bike but doesn’t use anymore.

## 2023-04-01 NOTE — ASSESSMENT
[FreeTextEntry1] : 66 yo F with PMHx of GERD, hyperlipidemia, sinusitis, and urge incontinence here for CPE.\par \par #HCM\par CBC, CMP, A1c, Lipid panel today\par GI referral for colonoscopy\par Mammogram referral given\par \par \par RTC in 6 months

## 2023-04-01 NOTE — REVIEW OF SYSTEMS
[Itching] : Itching [Skin Rash] : skin rash [Negative] : Neurological [FreeTextEntry9] : R shoulder pain

## 2023-04-05 LAB
ALBUMIN SERPL ELPH-MCNC: 4.4 G/DL
ALP BLD-CCNC: 90 U/L
ALT SERPL-CCNC: 24 U/L
ANION GAP SERPL CALC-SCNC: 15 MMOL/L
AST SERPL-CCNC: 32 U/L
BILIRUB SERPL-MCNC: 0.4 MG/DL
BUN SERPL-MCNC: 20 MG/DL
CALCIUM SERPL-MCNC: 10.7 MG/DL
CHLORIDE SERPL-SCNC: 103 MMOL/L
CHOLEST SERPL-MCNC: 187 MG/DL
CO2 SERPL-SCNC: 24 MMOL/L
CREAT SERPL-MCNC: 0.95 MG/DL
EGFR: 66 ML/MIN/1.73M2
ESTIMATED AVERAGE GLUCOSE: 128 MG/DL
GLUCOSE SERPL-MCNC: 88 MG/DL
HBA1C MFR BLD HPLC: 6.1 %
HCT VFR BLD CALC: 44.7 %
HDLC SERPL-MCNC: 68 MG/DL
HGB BLD-MCNC: 14.1 G/DL
LDLC SERPL CALC-MCNC: 91 MG/DL
MCHC RBC-ENTMCNC: 29.6 PG
MCHC RBC-ENTMCNC: 31.5 GM/DL
MCV RBC AUTO: 93.9 FL
NONHDLC SERPL-MCNC: 119 MG/DL
PLATELET # BLD AUTO: 236 K/UL
POTASSIUM SERPL-SCNC: 5.4 MMOL/L
PROT SERPL-MCNC: 7.7 G/DL
RBC # BLD: 4.76 M/UL
RBC # FLD: 12.8 %
SODIUM SERPL-SCNC: 142 MMOL/L
TRIGL SERPL-MCNC: 141 MG/DL
TSH SERPL-ACNC: 0.93 UIU/ML
WBC # FLD AUTO: 5.81 K/UL

## 2023-04-06 ENCOUNTER — RESULT REVIEW (OUTPATIENT)
Age: 68
End: 2023-04-06

## 2023-04-06 ENCOUNTER — OUTPATIENT (OUTPATIENT)
Dept: OUTPATIENT SERVICES | Facility: HOSPITAL | Age: 68
LOS: 1 days | End: 2023-04-06
Payer: MEDICARE

## 2023-04-06 ENCOUNTER — APPOINTMENT (OUTPATIENT)
Dept: MAMMOGRAPHY | Facility: IMAGING CENTER | Age: 68
End: 2023-04-06
Payer: MEDICARE

## 2023-04-06 DIAGNOSIS — Z90.710 ACQUIRED ABSENCE OF BOTH CERVIX AND UTERUS: Chronic | ICD-10-CM

## 2023-04-06 DIAGNOSIS — Z98.51 TUBAL LIGATION STATUS: Chronic | ICD-10-CM

## 2023-04-06 DIAGNOSIS — Z98.89 OTHER SPECIFIED POSTPROCEDURAL STATES: Chronic | ICD-10-CM

## 2023-04-06 DIAGNOSIS — Z12.39 ENCOUNTER FOR OTHER SCREENING FOR MALIGNANT NEOPLASM OF BREAST: ICD-10-CM

## 2023-04-06 DIAGNOSIS — Z00.8 ENCOUNTER FOR OTHER GENERAL EXAMINATION: ICD-10-CM

## 2023-04-06 LAB
ALBUMIN SERPL ELPH-MCNC: 3.9 G/DL
ALP BLD-CCNC: 77 U/L
ALT SERPL-CCNC: 22 U/L
ANION GAP SERPL CALC-SCNC: 11 MMOL/L
AST SERPL-CCNC: 31 U/L
BILIRUB SERPL-MCNC: 0.4 MG/DL
BUN SERPL-MCNC: 17 MG/DL
CALCIUM SERPL-MCNC: 9.6 MG/DL
CHLORIDE SERPL-SCNC: 106 MMOL/L
CO2 SERPL-SCNC: 23 MMOL/L
CREAT SERPL-MCNC: 0.99 MG/DL
EGFR: 63 ML/MIN/1.73M2
GLUCOSE SERPL-MCNC: 123 MG/DL
POTASSIUM SERPL-SCNC: 4.7 MMOL/L
PROT SERPL-MCNC: 6.9 G/DL
SODIUM SERPL-SCNC: 140 MMOL/L

## 2023-04-06 PROCEDURE — 77063 BREAST TOMOSYNTHESIS BI: CPT | Mod: 26

## 2023-04-06 PROCEDURE — 77063 BREAST TOMOSYNTHESIS BI: CPT

## 2023-04-06 PROCEDURE — 77067 SCR MAMMO BI INCL CAD: CPT

## 2023-04-06 PROCEDURE — 77067 SCR MAMMO BI INCL CAD: CPT | Mod: 26

## 2023-04-06 PROCEDURE — 80053 COMPREHEN METABOLIC PANEL: CPT

## 2023-04-07 ENCOUNTER — NON-APPOINTMENT (OUTPATIENT)
Age: 68
End: 2023-04-07

## 2023-04-11 ENCOUNTER — NON-APPOINTMENT (OUTPATIENT)
Age: 68
End: 2023-04-11

## 2023-05-24 ENCOUNTER — APPOINTMENT (OUTPATIENT)
Dept: ORTHOPEDIC SURGERY | Facility: HOSPITAL | Age: 68
End: 2023-05-24

## 2023-05-24 ENCOUNTER — OUTPATIENT (OUTPATIENT)
Dept: OUTPATIENT SERVICES | Facility: HOSPITAL | Age: 68
LOS: 1 days | End: 2023-05-24
Payer: MEDICARE

## 2023-05-24 VITALS
DIASTOLIC BLOOD PRESSURE: 98 MMHG | WEIGHT: 207 LBS | TEMPERATURE: 97.6 F | HEIGHT: 61 IN | BODY MASS INDEX: 39.08 KG/M2 | HEART RATE: 92 BPM | SYSTOLIC BLOOD PRESSURE: 134 MMHG | RESPIRATION RATE: 14 BRPM

## 2023-05-24 DIAGNOSIS — Z98.89 OTHER SPECIFIED POSTPROCEDURAL STATES: Chronic | ICD-10-CM

## 2023-05-24 DIAGNOSIS — R22.30 LOCALIZED SWELLING, MASS AND LUMP, UNSPECIFIED UPPER LIMB: ICD-10-CM

## 2023-05-24 DIAGNOSIS — M79.9 SOFT TISSUE DISORDER, UNSPECIFIED: ICD-10-CM

## 2023-05-24 DIAGNOSIS — Z90.710 ACQUIRED ABSENCE OF BOTH CERVIX AND UTERUS: Chronic | ICD-10-CM

## 2023-05-24 DIAGNOSIS — Z98.51 TUBAL LIGATION STATUS: Chronic | ICD-10-CM

## 2023-05-24 PROCEDURE — G0463: CPT

## 2023-05-24 RX ORDER — METOPROLOL SUCCINATE 25 MG/1
25 TABLET, EXTENDED RELEASE ORAL
Qty: 30 | Refills: 3 | Status: DISCONTINUED | COMMUNITY
Start: 2020-01-15 | End: 2023-05-24

## 2023-05-24 RX ORDER — ASPIRIN ENTERIC COATED TABLETS 81 MG 81 MG/1
81 TABLET, DELAYED RELEASE ORAL
Qty: 30 | Refills: 5 | Status: DISCONTINUED | COMMUNITY
Start: 2019-10-23 | End: 2023-05-24

## 2023-09-19 ENCOUNTER — OUTPATIENT (OUTPATIENT)
Dept: OUTPATIENT SERVICES | Facility: HOSPITAL | Age: 68
LOS: 1 days | End: 2023-09-19
Payer: MEDICARE

## 2023-09-19 ENCOUNTER — APPOINTMENT (OUTPATIENT)
Dept: GASTROENTEROLOGY | Facility: HOSPITAL | Age: 68
End: 2023-09-19
Payer: MEDICARE

## 2023-09-19 VITALS
RESPIRATION RATE: 14 BRPM | OXYGEN SATURATION: 99 % | BODY MASS INDEX: 39.65 KG/M2 | HEIGHT: 61 IN | SYSTOLIC BLOOD PRESSURE: 128 MMHG | HEART RATE: 89 BPM | TEMPERATURE: 97.9 F | WEIGHT: 210 LBS | DIASTOLIC BLOOD PRESSURE: 90 MMHG

## 2023-09-19 DIAGNOSIS — K21.9 GASTRO-ESOPHAGEAL REFLUX DISEASE W/OUT ESOPHAGITIS: ICD-10-CM

## 2023-09-19 DIAGNOSIS — Z90.710 ACQUIRED ABSENCE OF BOTH CERVIX AND UTERUS: Chronic | ICD-10-CM

## 2023-09-19 DIAGNOSIS — Z98.89 OTHER SPECIFIED POSTPROCEDURAL STATES: Chronic | ICD-10-CM

## 2023-09-19 DIAGNOSIS — Z98.51 TUBAL LIGATION STATUS: Chronic | ICD-10-CM

## 2023-09-19 DIAGNOSIS — Z78.9 OTHER SPECIFIED HEALTH STATUS: ICD-10-CM

## 2023-09-19 DIAGNOSIS — Z86.010 PERSONAL HISTORY OF COLONIC POLYPS: ICD-10-CM

## 2023-09-19 DIAGNOSIS — Z91.89 OTHER SPECIFIED PERSONAL RISK FACTORS, NOT ELSEWHERE CLASSIFIED: ICD-10-CM

## 2023-09-19 DIAGNOSIS — E66.01 MORBID (SEVERE) OBESITY DUE TO EXCESS CALORIES: ICD-10-CM

## 2023-09-19 DIAGNOSIS — K21.9 GASTRO-ESOPHAGEAL REFLUX DISEASE WITHOUT ESOPHAGITIS: ICD-10-CM

## 2023-09-19 DIAGNOSIS — R10.9 UNSPECIFIED ABDOMINAL PAIN: ICD-10-CM

## 2023-09-19 PROCEDURE — G0463: CPT

## 2023-09-19 PROCEDURE — 99204 OFFICE O/P NEW MOD 45 MIN: CPT | Mod: GC

## 2023-10-03 RX ORDER — SODIUM CHLORIDE 9 MG/ML
500 INJECTION, SOLUTION INTRAVENOUS
Refills: 0 | Status: DISCONTINUED | OUTPATIENT
Start: 2023-10-18 | End: 2023-11-02

## 2023-10-03 NOTE — PRE PROCEDURE NOTE - PRE PROCEDURE EVALUATION
Attending Physician: Dr. Eagle                         Procedure: colonoscopy     Indication for Procedure: surveillance colonoscopy, history of TA   ________________________________________________________  PAST MEDICAL & SURGICAL HISTORY:  Heartburn      High cholesterol      Hypertension      H/O: hysterectomy      H/O umbilical hernia repair      H/O tubal ligation        ALLERGIES:  No Known Allergies    HOME MEDICATIONS:  aspirin 81 mg oral delayed release tablet: 1 tab(s) orally once a day  atorvastatin 80 mg oral tablet: 1 tab(s) orally once a day  fluticasone 50 mcg/inh nasal spray: 1 spray(s) nasal once a day  Metoprolol Succinate ER 25 mg oral tablet, extended release: 1 tab(s) orally once a day  pantoprazole 40 mg oral delayed release tablet: 1 tab(s) orally once a day    AICD/PPM: [ ] yes   [X] no    PERTINENT LAB DATA:                      PHYSICAL EXAMINATION:    T(C): --  HR: --  BP: --  RR: --  SpO2: --  See physical exam under H&P/Progress note      COMMENTS:    The patient is a suitable candidate for the planned procedure unless box checked [ ]  No, explain:

## 2023-10-18 ENCOUNTER — TRANSCRIPTION ENCOUNTER (OUTPATIENT)
Age: 68
End: 2023-10-18

## 2023-10-18 ENCOUNTER — RESULT REVIEW (OUTPATIENT)
Age: 68
End: 2023-10-18

## 2023-10-18 ENCOUNTER — OUTPATIENT (OUTPATIENT)
Dept: OUTPATIENT SERVICES | Facility: HOSPITAL | Age: 68
LOS: 1 days | End: 2023-10-18
Payer: MEDICARE

## 2023-10-18 VITALS
SYSTOLIC BLOOD PRESSURE: 150 MMHG | DIASTOLIC BLOOD PRESSURE: 91 MMHG | RESPIRATION RATE: 13 BRPM | OXYGEN SATURATION: 99 % | TEMPERATURE: 98 F | HEIGHT: 61 IN | HEART RATE: 84 BPM | WEIGHT: 207.01 LBS

## 2023-10-18 VITALS
DIASTOLIC BLOOD PRESSURE: 72 MMHG | OXYGEN SATURATION: 100 % | RESPIRATION RATE: 15 BRPM | SYSTOLIC BLOOD PRESSURE: 117 MMHG | HEART RATE: 66 BPM

## 2023-10-18 DIAGNOSIS — Z98.51 TUBAL LIGATION STATUS: Chronic | ICD-10-CM

## 2023-10-18 DIAGNOSIS — Z98.89 OTHER SPECIFIED POSTPROCEDURAL STATES: Chronic | ICD-10-CM

## 2023-10-18 DIAGNOSIS — Z86.010 PERSONAL HISTORY OF COLONIC POLYPS: ICD-10-CM

## 2023-10-18 DIAGNOSIS — Z90.710 ACQUIRED ABSENCE OF BOTH CERVIX AND UTERUS: Chronic | ICD-10-CM

## 2023-10-18 PROCEDURE — 45385 COLONOSCOPY W/LESION REMOVAL: CPT | Mod: GC

## 2023-10-18 PROCEDURE — 45385 COLONOSCOPY W/LESION REMOVAL: CPT

## 2023-10-18 PROCEDURE — 88305 TISSUE EXAM BY PATHOLOGIST: CPT

## 2023-10-18 PROCEDURE — 45380 COLONOSCOPY AND BIOPSY: CPT | Mod: 59,GC

## 2023-10-18 PROCEDURE — 88305 TISSUE EXAM BY PATHOLOGIST: CPT | Mod: 26

## 2023-10-18 PROCEDURE — 45380 COLONOSCOPY AND BIOPSY: CPT | Mod: XS,PT

## 2023-10-18 DEVICE — NET RETRV ROT ROTH 2.5MMX230CM: Type: IMPLANTABLE DEVICE | Status: FUNCTIONAL

## 2023-10-18 RX ORDER — PANTOPRAZOLE SODIUM 20 MG/1
1 TABLET, DELAYED RELEASE ORAL
Qty: 0 | Refills: 0 | DISCHARGE

## 2023-10-18 RX ORDER — FLUTICASONE PROPIONATE 50 MCG
1 SPRAY, SUSPENSION NASAL
Qty: 0 | Refills: 0 | DISCHARGE

## 2023-10-18 NOTE — ASU PREOP CHECKLIST - BLOOD AVAILABLE
Detail Level: Detailed
Quality 130: Documentation Of Current Medications In The Medical Record: Current Medications Documented
Quality 110: Preventive Care And Screening: Influenza Immunization: Influenza Immunization not Administered for Documented Reasons.
n/a

## 2023-10-18 NOTE — ASU PATIENT PROFILE, ADULT - FALL HARM RISK - UNIVERSAL INTERVENTIONS
Bed in lowest position, wheels locked, appropriate side rails in place/Call bell, personal items and telephone in reach/Instruct patient to call for assistance before getting out of bed or chair/Non-slip footwear when patient is out of bed/[place] to call system/Physically safe environment - no spills, clutter or unnecessary equipment/Purposeful Proactive Rounding/Room/bathroom lighting operational, light cord in reach

## 2023-10-18 NOTE — ASU DISCHARGE PLAN (ADULT/PEDIATRIC) - NS MD DC FALL RISK RISK
For information on Fall & Injury Prevention, visit: [email or web address] OR  [email or web address] OR  https://www.cdc.gov/steadi/patient.html

## 2023-10-18 NOTE — ASU PATIENT PROFILE, ADULT - NSICDXPASTSURGICALHX_GEN_ALL_CORE_FT
PAST SURGICAL HISTORY:  H/O tubal ligation     H/O umbilical hernia repair     H/O: hysterectomy

## 2023-10-23 LAB
SURGICAL PATHOLOGY STUDY: SIGNIFICANT CHANGE UP
SURGICAL PATHOLOGY STUDY: SIGNIFICANT CHANGE UP

## 2023-10-25 ENCOUNTER — APPOINTMENT (OUTPATIENT)
Dept: OBGYN | Facility: CLINIC | Age: 68
End: 2023-10-25
Payer: MEDICARE

## 2023-10-25 ENCOUNTER — LABORATORY RESULT (OUTPATIENT)
Age: 68
End: 2023-10-25

## 2023-10-25 VITALS — DIASTOLIC BLOOD PRESSURE: 93 MMHG | SYSTOLIC BLOOD PRESSURE: 138 MMHG

## 2023-10-25 DIAGNOSIS — Z01.419 ENCOUNTER FOR GYNECOLOGICAL EXAMINATION (GENERAL) (ROUTINE) W/OUT ABNORMAL FINDINGS: ICD-10-CM

## 2023-10-25 DIAGNOSIS — Z80.41 FAMILY HISTORY OF MALIGNANT NEOPLASM OF OVARY: ICD-10-CM

## 2023-10-25 PROCEDURE — 99397 PER PM REEVAL EST PAT 65+ YR: CPT

## 2023-10-26 LAB
CANDIDA VAG CYTO: NOT DETECTED
G VAGINALIS+PREV SP MTYP VAG QL MICRO: NOT DETECTED
T VAGINALIS VAG QL WET PREP: NOT DETECTED

## 2023-10-30 ENCOUNTER — RX RENEWAL (OUTPATIENT)
Age: 68
End: 2023-10-30

## 2023-11-01 ENCOUNTER — RESULT REVIEW (OUTPATIENT)
Age: 68
End: 2023-11-01

## 2023-11-01 ENCOUNTER — OUTPATIENT (OUTPATIENT)
Dept: OUTPATIENT SERVICES | Facility: HOSPITAL | Age: 68
LOS: 1 days | End: 2023-11-01
Payer: MEDICARE

## 2023-11-01 ENCOUNTER — APPOINTMENT (OUTPATIENT)
Dept: INTERNAL MEDICINE | Facility: CLINIC | Age: 68
End: 2023-11-01
Payer: MEDICARE

## 2023-11-01 VITALS
SYSTOLIC BLOOD PRESSURE: 136 MMHG | HEIGHT: 61 IN | DIASTOLIC BLOOD PRESSURE: 80 MMHG | WEIGHT: 206 LBS | BODY MASS INDEX: 38.89 KG/M2 | HEART RATE: 82 BPM | OXYGEN SATURATION: 98 %

## 2023-11-01 DIAGNOSIS — Z98.89 OTHER SPECIFIED POSTPROCEDURAL STATES: Chronic | ICD-10-CM

## 2023-11-01 DIAGNOSIS — I10 ESSENTIAL (PRIMARY) HYPERTENSION: ICD-10-CM

## 2023-11-01 DIAGNOSIS — Z98.51 TUBAL LIGATION STATUS: Chronic | ICD-10-CM

## 2023-11-01 DIAGNOSIS — Z90.710 ACQUIRED ABSENCE OF BOTH CERVIX AND UTERUS: Chronic | ICD-10-CM

## 2023-11-01 DIAGNOSIS — M25.561 PAIN IN RIGHT KNEE: ICD-10-CM

## 2023-11-01 DIAGNOSIS — M25.562 PAIN IN RIGHT KNEE: ICD-10-CM

## 2023-11-01 LAB — CYTOLOGY CVX/VAG DOC THIN PREP: ABNORMAL

## 2023-11-01 PROCEDURE — G0463: CPT

## 2023-11-01 PROCEDURE — 99213 OFFICE O/P EST LOW 20 MIN: CPT | Mod: GC

## 2023-11-01 RX ORDER — PANTOPRAZOLE 40 MG/1
40 TABLET, DELAYED RELEASE ORAL DAILY
Qty: 30 | Refills: 5 | Status: ACTIVE | COMMUNITY
Start: 2017-12-27 | End: 1900-01-01

## 2023-11-01 RX ORDER — POLYETHYLENE GLYCOL 3350 AND ELECTROLYTES WITH LEMON FLAVOR 236; 22.74; 6.74; 5.86; 2.97 G/4L; G/4L; G/4L; G/4L; G/4L
236 POWDER, FOR SOLUTION ORAL
Qty: 1 | Refills: 0 | Status: DISCONTINUED | COMMUNITY
Start: 2023-09-19 | End: 2023-11-01

## 2023-11-02 ENCOUNTER — OUTPATIENT (OUTPATIENT)
Dept: OUTPATIENT SERVICES | Facility: HOSPITAL | Age: 68
LOS: 1 days | End: 2023-11-02
Payer: MEDICARE

## 2023-11-02 ENCOUNTER — APPOINTMENT (OUTPATIENT)
Dept: RADIOLOGY | Facility: IMAGING CENTER | Age: 68
End: 2023-11-02
Payer: MEDICARE

## 2023-11-02 ENCOUNTER — NON-APPOINTMENT (OUTPATIENT)
Age: 68
End: 2023-11-02

## 2023-11-02 DIAGNOSIS — Z98.51 TUBAL LIGATION STATUS: Chronic | ICD-10-CM

## 2023-11-02 DIAGNOSIS — Z90.710 ACQUIRED ABSENCE OF BOTH CERVIX AND UTERUS: Chronic | ICD-10-CM

## 2023-11-02 DIAGNOSIS — Z98.89 OTHER SPECIFIED POSTPROCEDURAL STATES: Chronic | ICD-10-CM

## 2023-11-02 DIAGNOSIS — M25.561 PAIN IN RIGHT KNEE: ICD-10-CM

## 2023-11-02 PROCEDURE — 73565 X-RAY EXAM OF KNEES: CPT

## 2023-11-02 PROCEDURE — 73565 X-RAY EXAM OF KNEES: CPT | Mod: 26

## 2023-11-03 RX ORDER — ESTRADIOL 0.1 MG/G
0.1 CREAM VAGINAL
Qty: 1 | Refills: 6 | Status: ACTIVE | COMMUNITY
Start: 2023-11-03 | End: 1900-01-01

## 2023-11-07 DIAGNOSIS — M25.561 PAIN IN RIGHT KNEE: ICD-10-CM

## 2023-11-07 DIAGNOSIS — M25.562 PAIN IN LEFT KNEE: ICD-10-CM

## 2024-01-02 ENCOUNTER — APPOINTMENT (OUTPATIENT)
Dept: INTERNAL MEDICINE | Facility: CLINIC | Age: 69
End: 2024-01-02

## 2024-01-30 ENCOUNTER — NON-APPOINTMENT (OUTPATIENT)
Age: 69
End: 2024-01-30

## 2024-01-31 ENCOUNTER — APPOINTMENT (OUTPATIENT)
Dept: OBGYN | Facility: CLINIC | Age: 69
End: 2024-01-31
Payer: MEDICARE

## 2024-01-31 VITALS — DIASTOLIC BLOOD PRESSURE: 95 MMHG | HEIGHT: 61 IN | SYSTOLIC BLOOD PRESSURE: 139 MMHG

## 2024-01-31 DIAGNOSIS — K64.4 RESIDUAL HEMORRHOIDAL SKIN TAGS: ICD-10-CM

## 2024-01-31 DIAGNOSIS — N95.2 POSTMENOPAUSAL ATROPHIC VAGINITIS: ICD-10-CM

## 2024-01-31 LAB
BILIRUB UR QL STRIP: NORMAL
GLUCOSE UR-MCNC: NORMAL
HCG UR QL: 0.2 EU/DL
HGB UR QL STRIP.AUTO: NORMAL
KETONES UR-MCNC: NORMAL
LEUKOCYTE ESTERASE UR QL STRIP: NORMAL
NITRITE UR QL STRIP: NORMAL
PH UR STRIP: 6.5
PROT UR STRIP-MCNC: NORMAL
SP GR UR STRIP: 1.02

## 2024-01-31 PROCEDURE — 81003 URINALYSIS AUTO W/O SCOPE: CPT | Mod: QW

## 2024-01-31 PROCEDURE — 99213 OFFICE O/P EST LOW 20 MIN: CPT | Mod: 25

## 2024-02-02 LAB
BACTERIA UR CULT: NORMAL
CANDIDA VAG CYTO: NOT DETECTED
G VAGINALIS+PREV SP MTYP VAG QL MICRO: NOT DETECTED
T VAGINALIS VAG QL WET PREP: NOT DETECTED

## 2024-02-05 ENCOUNTER — APPOINTMENT (OUTPATIENT)
Dept: UROGYNECOLOGY | Facility: CLINIC | Age: 69
End: 2024-02-05
Payer: MEDICARE

## 2024-02-05 LAB
BILIRUB UR QL STRIP: NORMAL
CLARITY UR: CLEAR
COLLECTION METHOD: NORMAL
GLUCOSE UR-MCNC: NORMAL
HCG UR QL: 0.2 EU/DL
HGB UR QL STRIP.AUTO: NORMAL
KETONES UR-MCNC: NORMAL
LEUKOCYTE ESTERASE UR QL STRIP: NORMAL
NITRITE UR QL STRIP: NORMAL
PH UR STRIP: 7
PROT UR STRIP-MCNC: NORMAL
SP GR UR STRIP: 1.02

## 2024-02-05 PROCEDURE — 99214 OFFICE O/P EST MOD 30 MIN: CPT | Mod: 25

## 2024-02-05 PROCEDURE — 51701 INSERT BLADDER CATHETER: CPT

## 2024-02-05 NOTE — PROCEDURE
[Straight Catheterization] : insertion of a straight catheter [Hematuria] : hematuria [Patient] : the patient [None] : none [___ Fr Straight Tip] : a [unfilled] in [place] straight tip catheter [Clear] : clear [Culture] : culture [Urinalysis] : urinalysis [FreeTextEntry9] : PVR 50 ml.

## 2024-02-05 NOTE — PHYSICAL EXAM
[Chaperone Present] : A chaperone was present in the examining room during all aspects of the physical examination [FreeTextEntry1] : General: Well, appearing, no acute distress HEENT: Normocephalic, atraumatic Respiratory: Speaking in full sentences comfortably, normal work of breathing and no cough during visit Extremities: No upper extremity edema noted Skin: No obvious rash or skin lesions Neuro: Alert and oriented x 3, speech is fluent, normal rate Psych: Normal mood and affect [Vulvar Atrophy] : vulvar atrophy [Atrophy] : atrophy [Normal] : was normal [Absent] : absent

## 2024-02-05 NOTE — HISTORY OF PRESENT ILLNESS
[FreeTextEntry1] : TALAT LEROY is a 68-year-old presenting for evaluation of possible hematuria. Has a h/o FELIPE. About one week ago she noticed blood in the front of her pad. She has a h/o hemorrhoids due to which she was previously having rectal bleeding, but that had resolved. She does not believe blood is from the rectum. Has had only one episode of this. Denies dysuria. Has right sided back pain and reports feeling overall unwell. No know h/o stones. Denies h/o smoking but has had some exposure in past from second-hand smoking. No exposure to other chemical. Family history notable for ovarian and brain CA (2nd degree relatives).   Daytime voids: 10  Nighttime voids: 1  Her fluid intake includes: 2 cups of caffeine, 5 cups of water.  Previously had issues with urinary incontinence, however reports symptoms now improved.

## 2024-02-05 NOTE — DISCUSSION/SUMMARY
[FreeTextEntry1] : #Hematuria - Possible gross hematuria, urine dipstick +large blood; will follow-up UA/UCx - Reviewed possible causes of hematuria - Discussed work-up, will obtain CTU and perform cystoscopy - Lab requisition for BUN and Cr placed

## 2024-02-06 DIAGNOSIS — Z78.9 OTHER SPECIFIED HEALTH STATUS: ICD-10-CM

## 2024-02-06 DIAGNOSIS — Z80.3 FAMILY HISTORY OF MALIGNANT NEOPLASM OF BREAST: ICD-10-CM

## 2024-02-06 DIAGNOSIS — Z87.19 PERSONAL HISTORY OF OTHER DISEASES OF THE DIGESTIVE SYSTEM: ICD-10-CM

## 2024-02-06 DIAGNOSIS — Z86.39 PERSONAL HISTORY OF OTHER ENDOCRINE, NUTRITIONAL AND METABOLIC DISEASE: ICD-10-CM

## 2024-02-06 DIAGNOSIS — Z80.9 FAMILY HISTORY OF MALIGNANT NEOPLASM, UNSPECIFIED: ICD-10-CM

## 2024-02-06 DIAGNOSIS — Z82.49 FAMILY HISTORY OF ISCHEMIC HEART DISEASE AND OTHER DISEASES OF THE CIRCULATORY SYSTEM: ICD-10-CM

## 2024-02-06 DIAGNOSIS — Z83.42 FAMILY HISTORY OF FAMILIAL HYPERCHOLESTEROLEMIA: ICD-10-CM

## 2024-02-06 DIAGNOSIS — Z84.1 FAMILY HISTORY OF DISORDERS OF KIDNEY AND URETER: ICD-10-CM

## 2024-02-06 LAB
BUN SERPL-MCNC: 19 MG/DL
CREAT SERPL-MCNC: 0.86 MG/DL
EGFR: 74 ML/MIN/1.73M2

## 2024-02-07 ENCOUNTER — NON-APPOINTMENT (OUTPATIENT)
Age: 69
End: 2024-02-07

## 2024-02-07 LAB
APPEARANCE: CLEAR
BACTERIA UR CULT: NORMAL
BACTERIA: NEGATIVE /HPF
BILIRUBIN URINE: NEGATIVE
BLOOD URINE: ABNORMAL
CAST: 0 /LPF
COLOR: YELLOW
EPITHELIAL CELLS: 2 /HPF
GLUCOSE QUALITATIVE U: NEGATIVE MG/DL
KETONES URINE: NEGATIVE MG/DL
LEUKOCYTE ESTERASE URINE: NEGATIVE
MICROSCOPIC-UA: NORMAL
NITRITE URINE: NEGATIVE
PH URINE: 7
PROTEIN URINE: NEGATIVE MG/DL
RED BLOOD CELLS URINE: 14 /HPF
SPECIFIC GRAVITY URINE: 1.02
UROBILINOGEN URINE: 0.2 MG/DL
WHITE BLOOD CELLS URINE: 0 /HPF

## 2024-02-11 ENCOUNTER — OUTPATIENT (OUTPATIENT)
Dept: OUTPATIENT SERVICES | Facility: HOSPITAL | Age: 69
LOS: 1 days | End: 2024-02-11
Payer: MEDICARE

## 2024-02-11 ENCOUNTER — APPOINTMENT (OUTPATIENT)
Dept: CT IMAGING | Facility: IMAGING CENTER | Age: 69
End: 2024-02-11
Payer: MEDICARE

## 2024-02-11 DIAGNOSIS — Z98.51 TUBAL LIGATION STATUS: Chronic | ICD-10-CM

## 2024-02-11 DIAGNOSIS — Z90.710 ACQUIRED ABSENCE OF BOTH CERVIX AND UTERUS: Chronic | ICD-10-CM

## 2024-02-11 DIAGNOSIS — R31.9 HEMATURIA, UNSPECIFIED: ICD-10-CM

## 2024-02-11 DIAGNOSIS — Z98.89 OTHER SPECIFIED POSTPROCEDURAL STATES: Chronic | ICD-10-CM

## 2024-02-11 PROCEDURE — 74178 CT ABD&PLV WO CNTR FLWD CNTR: CPT

## 2024-02-11 PROCEDURE — 74178 CT ABD&PLV WO CNTR FLWD CNTR: CPT | Mod: 26

## 2024-02-15 ENCOUNTER — RX CHANGE (OUTPATIENT)
Age: 69
End: 2024-02-15

## 2024-02-15 RX ORDER — ATORVASTATIN CALCIUM 40 MG/1
40 TABLET, FILM COATED ORAL
Qty: 1 | Refills: 3 | Status: ACTIVE | COMMUNITY
Start: 2024-02-15 | End: 1900-01-01

## 2024-02-27 ENCOUNTER — OUTPATIENT (OUTPATIENT)
Dept: OUTPATIENT SERVICES | Facility: HOSPITAL | Age: 69
LOS: 1 days | End: 2024-02-27
Payer: MEDICARE

## 2024-02-27 ENCOUNTER — APPOINTMENT (OUTPATIENT)
Dept: UROGYNECOLOGY | Facility: CLINIC | Age: 69
End: 2024-02-27
Payer: MEDICARE

## 2024-02-27 VITALS
HEART RATE: 85 BPM | DIASTOLIC BLOOD PRESSURE: 92 MMHG | WEIGHT: 200 LBS | BODY MASS INDEX: 37.76 KG/M2 | SYSTOLIC BLOOD PRESSURE: 133 MMHG | HEIGHT: 61 IN

## 2024-02-27 DIAGNOSIS — R31.9 HEMATURIA, UNSPECIFIED: ICD-10-CM

## 2024-02-27 DIAGNOSIS — Z98.89 OTHER SPECIFIED POSTPROCEDURAL STATES: Chronic | ICD-10-CM

## 2024-02-27 DIAGNOSIS — Z98.51 TUBAL LIGATION STATUS: Chronic | ICD-10-CM

## 2024-02-27 DIAGNOSIS — Z01.818 ENCOUNTER FOR OTHER PREPROCEDURAL EXAMINATION: ICD-10-CM

## 2024-02-27 DIAGNOSIS — Z90.710 ACQUIRED ABSENCE OF BOTH CERVIX AND UTERUS: Chronic | ICD-10-CM

## 2024-02-27 PROCEDURE — 52000 CYSTOURETHROSCOPY: CPT

## 2024-03-12 ENCOUNTER — APPOINTMENT (OUTPATIENT)
Dept: UROLOGY | Facility: CLINIC | Age: 69
End: 2024-03-12
Payer: MEDICARE

## 2024-03-12 VITALS
SYSTOLIC BLOOD PRESSURE: 139 MMHG | WEIGHT: 200 LBS | BODY MASS INDEX: 37.76 KG/M2 | HEIGHT: 61 IN | RESPIRATION RATE: 15 BRPM | DIASTOLIC BLOOD PRESSURE: 79 MMHG | OXYGEN SATURATION: 98 %

## 2024-03-12 PROCEDURE — 99203 OFFICE O/P NEW LOW 30 MIN: CPT

## 2024-03-12 NOTE — ASSESSMENT
[FreeTextEntry1] : 68 year old female with micro hematuria.  prior records reviewed   We discussed hematuria today and the multiple potential reasons for its presence. I discussed both benign and malignant etiologies that may explain hematuria. I've also reviewed the workup it is generally recommended for evaluation of hematuria with the purposes of ruling out malignancy or other urinary tract pathology that could warrant intervention. I've explained that cystoscopy would be recommended and the reasons for it as well as the risks of bleeding infection and damage to urethra.  Pt will follow up for the cystoscopy. and possible biopsy

## 2024-03-12 NOTE — PHYSICAL EXAM
[Normal Appearance] : normal appearance [Well Groomed] : well groomed [General Appearance - In No Acute Distress] : no acute distress [Edema] : no peripheral edema [Respiration, Rhythm And Depth] : normal respiratory rhythm and effort [Abdomen Soft] : soft [Exaggerated Use Of Accessory Muscles For Inspiration] : no accessory muscle use [Abdomen Tenderness] : non-tender [Costovertebral Angle Tenderness] : no ~M costovertebral angle tenderness [Normal Station and Gait] : the gait and station were normal for the patient's age [Urinary Bladder Findings] : the bladder was normal on palpation [] : no rash [No Focal Deficits] : no focal deficits [Oriented To Time, Place, And Person] : oriented to person, place, and time [Affect] : the affect was normal [Mood] : the mood was normal [No Palpable Adenopathy] : no palpable adenopathy

## 2024-03-12 NOTE — HISTORY OF PRESENT ILLNESS
[FreeTextEntry1] : cc micro hematuria  Pt is a 68 year old female that presents with c/o micro hematuria. Pt reports she was evaluated for micro hematuria and had a cystoscopy on 02/27/24 with a Urogynecologist. She also had a ct urogtramI done that was normal. Pt reports she saw the blood on her panty liner to the front and denies seeing blood in the toilet bowl. She also reports having brown discharge at the night. She denies dysuria or irritation. Pt denies taking ASA or other blood thinners.   no smoking FMHx: cancer and heart disease no FMHx of kidney or bladder cancer

## 2024-03-12 NOTE — END OF VISIT
[FreeTextEntry4] : This note was written by Myah Monique on 03/12/2024 actively solely Chaitanya King M.D. I, Myah Monique, am scribing for and in the presence of Chaitanya King M.D. in the following sections HISTORY OF PRESENT ILLNESS, [facility] MEDICAL/FAMILY/SOCIAL HISTORY; REVIEW OF SYSTEMS; VITAL SIGNS; PHYSICAL EXAM; ASSESSMENT/PLAN.   All medical record entries made by this scribe at my, Chaitanya King M.D. direction and personally dictated by me on 03/12/2024. I personally performed the services described in the documentation, reviewed the documentation recorded by the scribe in my presence, and it accurately and completely records my words and actions.

## 2024-03-22 ENCOUNTER — APPOINTMENT (OUTPATIENT)
Dept: UROLOGY | Facility: CLINIC | Age: 69
End: 2024-03-22
Payer: MEDICARE

## 2024-03-22 VITALS
BODY MASS INDEX: 37.76 KG/M2 | HEART RATE: 79 BPM | HEIGHT: 61 IN | TEMPERATURE: 97.3 F | WEIGHT: 200 LBS | OXYGEN SATURATION: 97 % | DIASTOLIC BLOOD PRESSURE: 94 MMHG | SYSTOLIC BLOOD PRESSURE: 134 MMHG

## 2024-03-22 DIAGNOSIS — R31.29 OTHER MICROSCOPIC HEMATURIA: ICD-10-CM

## 2024-03-22 PROCEDURE — 52000 CYSTOURETHROSCOPY: CPT

## 2024-04-01 ENCOUNTER — OUTPATIENT (OUTPATIENT)
Dept: OUTPATIENT SERVICES | Facility: HOSPITAL | Age: 69
LOS: 1 days | End: 2024-04-01
Payer: MEDICARE

## 2024-04-01 ENCOUNTER — APPOINTMENT (OUTPATIENT)
Dept: INTERNAL MEDICINE | Facility: CLINIC | Age: 69
End: 2024-04-01
Payer: MEDICARE

## 2024-04-01 VITALS
DIASTOLIC BLOOD PRESSURE: 86 MMHG | SYSTOLIC BLOOD PRESSURE: 128 MMHG | HEIGHT: 61 IN | WEIGHT: 208 LBS | HEART RATE: 85 BPM | BODY MASS INDEX: 39.27 KG/M2 | OXYGEN SATURATION: 98 %

## 2024-04-01 DIAGNOSIS — I10 ESSENTIAL (PRIMARY) HYPERTENSION: ICD-10-CM

## 2024-04-01 DIAGNOSIS — Z98.89 OTHER SPECIFIED POSTPROCEDURAL STATES: Chronic | ICD-10-CM

## 2024-04-01 DIAGNOSIS — Z00.00 ENCOUNTER FOR GENERAL ADULT MEDICAL EXAMINATION W/OUT ABNORMAL FINDINGS: ICD-10-CM

## 2024-04-01 DIAGNOSIS — Z98.51 TUBAL LIGATION STATUS: Chronic | ICD-10-CM

## 2024-04-01 DIAGNOSIS — Z90.710 ACQUIRED ABSENCE OF BOTH CERVIX AND UTERUS: Chronic | ICD-10-CM

## 2024-04-01 PROCEDURE — 83036 HEMOGLOBIN GLYCOSYLATED A1C: CPT

## 2024-04-01 PROCEDURE — 99213 OFFICE O/P EST LOW 20 MIN: CPT | Mod: GE

## 2024-04-01 PROCEDURE — G0463: CPT

## 2024-04-01 PROCEDURE — 85027 COMPLETE CBC AUTOMATED: CPT

## 2024-04-01 RX ORDER — IBUPROFEN 200 MG/1
200 TABLET ORAL 4 TIMES DAILY
Qty: 120 | Refills: 2 | Status: DISCONTINUED | COMMUNITY
Start: 2022-09-30 | End: 2024-04-01

## 2024-04-01 RX ORDER — DICLOFENAC SODIUM 3 G/100G
3 GEL TOPICAL TWICE DAILY
Qty: 1 | Refills: 1 | Status: DISCONTINUED | COMMUNITY
Start: 2023-11-01 | End: 2024-04-01

## 2024-04-01 RX ORDER — POLYETHYLENE GLYCOL 3350 17 G/17G
17 POWDER, FOR SOLUTION ORAL
Qty: 30 | Refills: 1 | Status: ACTIVE | COMMUNITY
Start: 2024-04-01

## 2024-04-01 RX ORDER — FLUTICASONE PROPIONATE 50 UG/1
50 SPRAY, METERED NASAL DAILY
Qty: 1 | Refills: 2 | Status: ACTIVE | COMMUNITY
Start: 2022-12-08 | End: 1900-01-01

## 2024-04-02 LAB
ESTIMATED AVERAGE GLUCOSE: 128 MG/DL
HBA1C MFR BLD HPLC: 6.1 %
HCT VFR BLD CALC: 43.5 %
HGB BLD-MCNC: 13.7 G/DL
MCHC RBC-ENTMCNC: 29.2 PG
MCHC RBC-ENTMCNC: 31.5 GM/DL
MCV RBC AUTO: 92.8 FL
PLATELET # BLD AUTO: 168 K/UL
RBC # BLD: 4.69 M/UL
RBC # FLD: 13 %
WBC # FLD AUTO: 5.38 K/UL

## 2024-04-02 NOTE — HISTORY OF PRESENT ILLNESS
[de-identified] : 68-year-old female w/ hx of GERD, HLD, sinusitis, and urge incontinence presenting for a CPE. She complaints about blood in her underwear lining. She was evaluated by ob/gyn and underwent a cystoscopy by urology without significant findings. She does complain of a history of hemorrhoids for which she has intermittent pain and bleeding. She has tried OTC medications without significant relief. Otherwise, no other complaints at this time. She has no dizziness, headaches, chest pain or palpitations.

## 2024-04-02 NOTE — HEALTH RISK ASSESSMENT
[Very Good] : ~his/her~ current health as very good [No] : No [No falls in past year] : Patient reported no falls in the past year [Never (0 pts)] : Never (0 points) [1 or 2 (0 pts)] : 1 or 2 (0 points) [Little interest or pleasure doing things] : 1) Little interest or pleasure doing things [Feeling down, depressed, or hopeless] : 2) Feeling down, depressed, or hopeless [0] : 2) Feeling down, depressed, or hopeless: Not at all (0) [PHQ-2 Negative - No further assessment needed] : PHQ-2 Negative - No further assessment needed [None] : None [With Family] : lives with family [] :  [Fully functional (bathing, dressing, toileting, transferring, walking, feeding)] : Fully functional (bathing, dressing, toileting, transferring, walking, feeding) [Fully functional (using the telephone, shopping, preparing meals, housekeeping, doing laundry, using] : Fully functional and needs no help or supervision to perform IADLs (using the telephone, shopping, preparing meals, housekeeping, doing laundry, using transportation, managing medications and managing finances) [de-identified] : Lot of social activities with friends, walking  [[id number]] : 0 [de-identified] : Home cooked foods, limited in unhealthy options  [High Risk Behavior] : no high risk behavior [Sexually Active] : not sexually active

## 2024-04-02 NOTE — ASSESSMENT
[FreeTextEntry1] : 68-year-old female w/ hx of GERD, HLD, sinusitis, and urge incontinence presenting for a CPE with complaints of hematuria and hemorrhoids.   #hematuria  Evaluated by cystoscopy without lesions. Colonoscopy performed with non-bleeding polyps in October. No change in appetite or weight loss.  - CBC  - c/w monitor, if worsening will referral to ob/gyn  #hemorrhoids  Currently without symptoms.  - Sitz bath  - miralax for constipation   #HCM  - optho referral  - routine labs, A1c - lipids well controlled  - colonscopy UTD - vaccines UTD   Case discussed with Dr. Lepe. RTC in 6 months. Post-encounter form provided.

## 2024-04-02 NOTE — PHYSICAL EXAM
[No Acute Distress] : no acute distress [Well Nourished] : well nourished [Well Developed] : well developed [Well-Appearing] : well-appearing [Normal Sclera/Conjunctiva] : normal sclera/conjunctiva [PERRL] : pupils equal round and reactive to light [Normal Outer Ear/Nose] : the outer ears and nose were normal in appearance [EOMI] : extraocular movements intact [Normal Oropharynx] : the oropharynx was normal [No JVD] : no jugular venous distention [No Lymphadenopathy] : no lymphadenopathy [Supple] : supple [Thyroid Normal, No Nodules] : the thyroid was normal and there were no nodules present [No Respiratory Distress] : no respiratory distress  [Clear to Auscultation] : lungs were clear to auscultation bilaterally [No Accessory Muscle Use] : no accessory muscle use [Regular Rhythm] : with a regular rhythm [Normal Rate] : normal rate  [No Murmur] : no murmur heard [Normal S1, S2] : normal S1 and S2 [No Abdominal Bruit] : a ~M bruit was not heard ~T in the abdomen [No Carotid Bruits] : no carotid bruits [No Varicosities] : no varicosities [Pedal Pulses Present] : the pedal pulses are present [No Palpable Aorta] : no palpable aorta [No Edema] : there was no peripheral edema [No Extremity Clubbing/Cyanosis] : no extremity clubbing/cyanosis [Soft] : abdomen soft [Non-distended] : non-distended [Non Tender] : non-tender [No HSM] : no HSM [No Masses] : no abdominal mass palpated [Normal Bowel Sounds] : normal bowel sounds [Normal Posterior Cervical Nodes] : no posterior cervical lymphadenopathy [No CVA Tenderness] : no CVA  tenderness [Normal Anterior Cervical Nodes] : no anterior cervical lymphadenopathy [No Spinal Tenderness] : no spinal tenderness [No Rash] : no rash [Grossly Normal Strength/Tone] : grossly normal strength/tone [No Joint Swelling] : no joint swelling [Coordination Grossly Intact] : coordination grossly intact [No Focal Deficits] : no focal deficits [Normal Gait] : normal gait [Deep Tendon Reflexes (DTR)] : deep tendon reflexes were 2+ and symmetric [Normal Affect] : the affect was normal [Normal Insight/Judgement] : insight and judgment were intact

## 2024-04-02 NOTE — COUNSELING
[Potential consequences of obesity discussed] : Potential consequences of obesity discussed [Benefits of weight loss discussed] : Benefits of weight loss discussed [Encouraged to increase physical activity] : Encouraged to increase physical activity [Encouraged to maintain food diary] : Encouraged to maintain food diary [Encouraged to use exercise tracking device] : Encouraged to use exercise tracking device

## 2024-04-08 DIAGNOSIS — Z00.00 ENCOUNTER FOR GENERAL ADULT MEDICAL EXAMINATION WITHOUT ABNORMAL FINDINGS: ICD-10-CM

## 2024-07-25 NOTE — PRE-ANESTHESIA EVALUATION ADULT - WEIGHT IN KG
93.9
Monitor for mental status changes/Monitor gait and stability/Provide patient with walking aids - walker, cane, crutches/Reinforce activity limits and safety measures with patient and family/Bed in lowest position, wheels locked, appropriate side rails in place/Call bell, personal items and telephone in reach/[place] to call system/Physically safe environment - no spills, clutter or unnecessary equipment/Purposeful Proactive Rounding/Room/bathroom lighting operational, light cord in reach

## 2024-11-25 ENCOUNTER — RX RENEWAL (OUTPATIENT)
Age: 69
End: 2024-11-25

## 2024-12-18 ENCOUNTER — RX RENEWAL (OUTPATIENT)
Age: 69
End: 2024-12-18

## 2025-02-13 ENCOUNTER — RX RENEWAL (OUTPATIENT)
Age: 70
End: 2025-02-13

## 2025-03-07 ENCOUNTER — NON-APPOINTMENT (OUTPATIENT)
Age: 70
End: 2025-03-07

## 2025-03-07 ENCOUNTER — APPOINTMENT (OUTPATIENT)
Dept: INTERNAL MEDICINE | Facility: CLINIC | Age: 70
End: 2025-03-07
Payer: MEDICARE

## 2025-03-07 ENCOUNTER — OUTPATIENT (OUTPATIENT)
Dept: OUTPATIENT SERVICES | Facility: HOSPITAL | Age: 70
LOS: 1 days | End: 2025-03-07
Payer: MEDICARE

## 2025-03-07 VITALS
BODY MASS INDEX: 39.11 KG/M2 | WEIGHT: 207 LBS | OXYGEN SATURATION: 96 % | DIASTOLIC BLOOD PRESSURE: 82 MMHG | SYSTOLIC BLOOD PRESSURE: 120 MMHG | HEART RATE: 79 BPM

## 2025-03-07 DIAGNOSIS — R41.3 OTHER AMNESIA: ICD-10-CM

## 2025-03-07 DIAGNOSIS — Z98.89 OTHER SPECIFIED POSTPROCEDURAL STATES: Chronic | ICD-10-CM

## 2025-03-07 DIAGNOSIS — E66.01 MORBID (SEVERE) OBESITY DUE TO EXCESS CALORIES: ICD-10-CM

## 2025-03-07 DIAGNOSIS — I10 ESSENTIAL (PRIMARY) HYPERTENSION: ICD-10-CM

## 2025-03-07 DIAGNOSIS — R73.03 PREDIABETES.: ICD-10-CM

## 2025-03-07 DIAGNOSIS — R06.09 OTHER FORMS OF DYSPNEA: ICD-10-CM

## 2025-03-07 DIAGNOSIS — Z90.710 ACQUIRED ABSENCE OF BOTH CERVIX AND UTERUS: Chronic | ICD-10-CM

## 2025-03-07 DIAGNOSIS — Z98.51 TUBAL LIGATION STATUS: Chronic | ICD-10-CM

## 2025-03-07 PROCEDURE — 99215 OFFICE O/P EST HI 40 MIN: CPT | Mod: 25

## 2025-03-07 PROCEDURE — 93010 ELECTROCARDIOGRAM REPORT: CPT

## 2025-03-07 PROCEDURE — G0463: CPT

## 2025-03-10 DIAGNOSIS — R73.03 PREDIABETES: ICD-10-CM

## 2025-03-10 DIAGNOSIS — Z13.6 ENCOUNTER FOR SCREENING FOR CARDIOVASCULAR DISORDERS: ICD-10-CM

## 2025-03-10 DIAGNOSIS — R06.09 OTHER FORMS OF DYSPNEA: ICD-10-CM

## 2025-03-10 DIAGNOSIS — E66.01 MORBID (SEVERE) OBESITY DUE TO EXCESS CALORIES: ICD-10-CM

## 2025-03-10 DIAGNOSIS — R41.3 OTHER AMNESIA: ICD-10-CM

## 2025-03-11 LAB
ALBUMIN SERPL ELPH-MCNC: 4.6 G/DL
ALP BLD-CCNC: 86 U/L
ALT SERPL-CCNC: 16 U/L
ANION GAP SERPL CALC-SCNC: 14 MMOL/L
AST SERPL-CCNC: 27 U/L
BASOPHILS # BLD AUTO: 0.02 K/UL
BASOPHILS NFR BLD AUTO: 0.4 %
BILIRUB SERPL-MCNC: 0.4 MG/DL
BUN SERPL-MCNC: 17 MG/DL
CALCIUM SERPL-MCNC: 10.6 MG/DL
CHLORIDE SERPL-SCNC: 103 MMOL/L
CHOLEST SERPL-MCNC: 164 MG/DL
CO2 SERPL-SCNC: 25 MMOL/L
CREAT SERPL-MCNC: 0.98 MG/DL
EGFRCR SERPLBLD CKD-EPI 2021: 63 ML/MIN/1.73M2
EOSINOPHIL # BLD AUTO: 0.1 K/UL
EOSINOPHIL NFR BLD AUTO: 1.8 %
ESTIMATED AVERAGE GLUCOSE: 137 MG/DL
FOLATE SERPL-MCNC: 10.5 NG/ML
GLUCOSE SERPL-MCNC: 113 MG/DL
HBA1C MFR BLD HPLC: 6.4 %
HCT VFR BLD CALC: 44.5 %
HDLC SERPL-MCNC: 65 MG/DL
HGB BLD-MCNC: 14.1 G/DL
HIV1+2 AB SPEC QL IA.RAPID: NONREACTIVE
IMM GRANULOCYTES NFR BLD AUTO: 0.2 %
LDLC SERPL CALC-MCNC: 83 MG/DL
LYMPHOCYTES # BLD AUTO: 2.47 K/UL
LYMPHOCYTES NFR BLD AUTO: 43.7 %
MAN DIFF?: NORMAL
MCHC RBC-ENTMCNC: 29.2 PG
MCHC RBC-ENTMCNC: 31.7 G/DL
MCV RBC AUTO: 92.1 FL
MONOCYTES # BLD AUTO: 0.58 K/UL
MONOCYTES NFR BLD AUTO: 10.3 %
NEUTROPHILS # BLD AUTO: 2.47 K/UL
NEUTROPHILS NFR BLD AUTO: 43.6 %
NONHDLC SERPL-MCNC: 99 MG/DL
NT-PROBNP SERPL-MCNC: <36 PG/ML
PLATELET # BLD AUTO: 242 K/UL
POTASSIUM SERPL-SCNC: 4.9 MMOL/L
PROT SERPL-MCNC: 8.1 G/DL
RBC # BLD: 4.83 M/UL
RBC # FLD: 12.9 %
SODIUM SERPL-SCNC: 142 MMOL/L
TRIGL SERPL-MCNC: 89 MG/DL
TSH SERPL-ACNC: 0.87 UIU/ML
VIT B12 SERPL-MCNC: 731 PG/ML
WBC # FLD AUTO: 5.65 K/UL

## 2025-03-12 ENCOUNTER — APPOINTMENT (OUTPATIENT)
Dept: CARDIOLOGY | Facility: CLINIC | Age: 70
End: 2025-03-12
Payer: MEDICARE

## 2025-03-12 LAB — RPR SER-TITR: NORMAL

## 2025-03-12 PROCEDURE — 93306 TTE W/DOPPLER COMPLETE: CPT

## 2025-03-13 ENCOUNTER — APPOINTMENT (OUTPATIENT)
Dept: CT IMAGING | Facility: CLINIC | Age: 70
End: 2025-03-13
Payer: MEDICARE

## 2025-03-13 DIAGNOSIS — G93.2 BENIGN INTRACRANIAL HYPERTENSION: ICD-10-CM

## 2025-03-13 PROCEDURE — 70470 CT HEAD/BRAIN W/O & W/DYE: CPT

## 2025-03-25 ENCOUNTER — OUTPATIENT (OUTPATIENT)
Dept: OUTPATIENT SERVICES | Facility: HOSPITAL | Age: 70
LOS: 1 days | End: 2025-03-25
Payer: MEDICARE

## 2025-03-25 ENCOUNTER — APPOINTMENT (OUTPATIENT)
Dept: NEUROLOGY | Facility: HOSPITAL | Age: 70
End: 2025-03-25

## 2025-03-25 VITALS
HEIGHT: 61 IN | HEART RATE: 79 BPM | BODY MASS INDEX: 38.71 KG/M2 | RESPIRATION RATE: 18 BRPM | OXYGEN SATURATION: 99 % | WEIGHT: 205 LBS | TEMPERATURE: 96 F

## 2025-03-25 DIAGNOSIS — R51.9 HEADACHE, UNSPECIFIED: ICD-10-CM

## 2025-03-25 DIAGNOSIS — Z90.710 ACQUIRED ABSENCE OF BOTH CERVIX AND UTERUS: Chronic | ICD-10-CM

## 2025-03-25 DIAGNOSIS — Z98.89 OTHER SPECIFIED POSTPROCEDURAL STATES: Chronic | ICD-10-CM

## 2025-03-25 DIAGNOSIS — M54.2 CERVICALGIA: ICD-10-CM

## 2025-03-25 PROCEDURE — G0463: CPT

## 2025-03-25 RX ORDER — TOPIRAMATE 25 MG/1
25 TABLET, FILM COATED ORAL TWICE DAILY
Qty: 60 | Refills: 3 | Status: ACTIVE | COMMUNITY
Start: 2025-03-25 | End: 1900-01-01

## 2025-03-25 RX ORDER — RIMEGEPANT SULFATE 75 MG/75MG
75 TABLET, ORALLY DISINTEGRATING ORAL DAILY
Qty: 30 | Refills: 3 | Status: ACTIVE | COMMUNITY
Start: 2025-03-25 | End: 1900-01-01

## 2025-03-25 RX ORDER — SAW/PYGEUM/BETA/HERB/D3/B6/ZN 30 MG-25MG
100 CAPSULE ORAL DAILY
Qty: 120 | Refills: 3 | Status: ACTIVE | COMMUNITY
Start: 2025-03-25 | End: 1900-01-01

## 2025-04-10 ENCOUNTER — TRANSCRIPTION ENCOUNTER (OUTPATIENT)
Age: 70
End: 2025-04-10

## 2025-04-10 ENCOUNTER — OUTPATIENT (OUTPATIENT)
Dept: OUTPATIENT SERVICES | Facility: HOSPITAL | Age: 70
LOS: 1 days | End: 2025-04-10
Payer: MEDICARE

## 2025-04-10 ENCOUNTER — APPOINTMENT (OUTPATIENT)
Dept: INTERNAL MEDICINE | Facility: CLINIC | Age: 70
End: 2025-04-10
Payer: MEDICARE

## 2025-04-10 VITALS
OXYGEN SATURATION: 97 % | WEIGHT: 203 LBS | SYSTOLIC BLOOD PRESSURE: 150 MMHG | DIASTOLIC BLOOD PRESSURE: 98 MMHG | HEART RATE: 53 BPM | HEIGHT: 61 IN | BODY MASS INDEX: 38.33 KG/M2

## 2025-04-10 DIAGNOSIS — M25.561 PAIN IN RIGHT KNEE: ICD-10-CM

## 2025-04-10 DIAGNOSIS — F32.89 OTHER SPECIFIED DEPRESSIVE EPISODES: ICD-10-CM

## 2025-04-10 DIAGNOSIS — R51.9 HEADACHE, UNSPECIFIED: ICD-10-CM

## 2025-04-10 DIAGNOSIS — I10 ESSENTIAL (PRIMARY) HYPERTENSION: ICD-10-CM

## 2025-04-10 DIAGNOSIS — Z00.00 ENCOUNTER FOR GENERAL ADULT MEDICAL EXAMINATION W/OUT ABNORMAL FINDINGS: ICD-10-CM

## 2025-04-10 DIAGNOSIS — Z98.89 OTHER SPECIFIED POSTPROCEDURAL STATES: Chronic | ICD-10-CM

## 2025-04-10 DIAGNOSIS — M25.562 PAIN IN RIGHT KNEE: ICD-10-CM

## 2025-04-10 DIAGNOSIS — Z90.710 ACQUIRED ABSENCE OF BOTH CERVIX AND UTERUS: Chronic | ICD-10-CM

## 2025-04-10 PROCEDURE — 85652 RBC SED RATE AUTOMATED: CPT

## 2025-04-10 PROCEDURE — G0463: CPT

## 2025-04-10 PROCEDURE — 86140 C-REACTIVE PROTEIN: CPT

## 2025-04-10 PROCEDURE — G2211 COMPLEX E/M VISIT ADD ON: CPT

## 2025-04-10 PROCEDURE — 99213 OFFICE O/P EST LOW 20 MIN: CPT | Mod: GE

## 2025-04-11 ENCOUNTER — APPOINTMENT (OUTPATIENT)
Dept: MRI IMAGING | Facility: CLINIC | Age: 70
End: 2025-04-11

## 2025-04-11 LAB
CRP SERPL-MCNC: <3 MG/L
ERYTHROCYTE [SEDIMENTATION RATE] IN BLOOD BY WESTERGREN METHOD: 26 MM/HR

## 2025-04-11 PROCEDURE — A9585: CPT

## 2025-04-11 PROCEDURE — 70544 MR ANGIOGRAPHY HEAD W/O DYE: CPT | Mod: XU

## 2025-04-11 PROCEDURE — 70553 MRI BRAIN STEM W/O & W/DYE: CPT

## 2025-04-11 PROCEDURE — A9585: CPT | Mod: JW

## 2025-04-15 DIAGNOSIS — D32.9 BENIGN NEOPLASM OF MENINGES, UNSPECIFIED: ICD-10-CM

## 2025-04-15 RX ORDER — SERTRALINE HYDROCHLORIDE 50 MG/1
50 TABLET, FILM COATED ORAL DAILY
Qty: 90 | Refills: 3 | Status: ACTIVE | COMMUNITY
Start: 2025-04-10 | End: 1900-01-01

## 2025-04-22 DIAGNOSIS — R51.9 HEADACHE, UNSPECIFIED: ICD-10-CM

## 2025-04-22 DIAGNOSIS — M25.562 PAIN IN LEFT KNEE: ICD-10-CM

## 2025-04-22 DIAGNOSIS — M25.561 PAIN IN RIGHT KNEE: ICD-10-CM

## 2025-04-22 DIAGNOSIS — F32.89 OTHER SPECIFIED DEPRESSIVE EPISODES: ICD-10-CM

## 2025-04-22 RX ORDER — RIMEGEPANT SULFATE 75 MG/75MG
75 TABLET, ORALLY DISINTEGRATING ORAL
Qty: 16 | Refills: 3 | Status: ACTIVE | COMMUNITY
Start: 2025-04-22 | End: 1900-01-01

## 2025-04-22 RX ORDER — NORTRIPTYLINE HYDROCHLORIDE 10 MG/1
10 CAPSULE ORAL
Qty: 60 | Refills: 3 | Status: ACTIVE | COMMUNITY
Start: 2025-04-22 | End: 1900-01-01

## 2025-04-22 RX ORDER — METHYLPREDNISOLONE 4 MG/1
4 TABLET ORAL
Qty: 1 | Refills: 0 | Status: ACTIVE | COMMUNITY
Start: 2025-04-22 | End: 1900-01-01

## 2025-04-29 ENCOUNTER — APPOINTMENT (OUTPATIENT)
Dept: UROLOGY | Facility: CLINIC | Age: 70
End: 2025-04-29
Payer: MEDICARE

## 2025-04-29 VITALS
DIASTOLIC BLOOD PRESSURE: 80 MMHG | SYSTOLIC BLOOD PRESSURE: 142 MMHG | HEIGHT: 61 IN | BODY MASS INDEX: 38.14 KG/M2 | WEIGHT: 202 LBS | HEART RATE: 89 BPM

## 2025-04-29 DIAGNOSIS — R31.9 HEMATURIA, UNSPECIFIED: ICD-10-CM

## 2025-04-29 DIAGNOSIS — N93.9 ABNORMAL UTERINE AND VAGINAL BLEEDING, UNSPECIFIED: ICD-10-CM

## 2025-04-29 PROCEDURE — G2211 COMPLEX E/M VISIT ADD ON: CPT

## 2025-04-29 PROCEDURE — 99214 OFFICE O/P EST MOD 30 MIN: CPT

## 2025-05-01 DIAGNOSIS — N39.0 URINARY TRACT INFECTION, SITE NOT SPECIFIED: ICD-10-CM

## 2025-05-01 RX ORDER — AMOXICILLIN AND CLAVULANATE POTASSIUM 875; 125 MG/1; MG/1
875-125 TABLET, COATED ORAL
Qty: 14 | Refills: 0 | Status: ACTIVE | COMMUNITY
Start: 2025-05-01 | End: 1900-01-01

## 2025-05-04 LAB
APPEARANCE: CLEAR
BACTERIA UR CULT: ABNORMAL
BACTERIA: NEGATIVE /HPF
BILIRUBIN URINE: NEGATIVE
BLOOD URINE: ABNORMAL
CALCIUM OXALATE CRYSTALS: PRESENT
CAST: 3 /LPF
COLOR: YELLOW
EPITHELIAL CELLS: 6 /HPF
GLUCOSE QUALITATIVE U: NEGATIVE MG/DL
HYALINE CASTS: PRESENT
KETONES URINE: ABNORMAL MG/DL
LEUKOCYTE ESTERASE URINE: ABNORMAL
MICROSCOPIC-UA: NORMAL
NITRITE URINE: NEGATIVE
PH URINE: 6
PROTEIN URINE: NORMAL MG/DL
RED BLOOD CELLS URINE: 20 /HPF
REVIEW: NORMAL
SPECIFIC GRAVITY URINE: 1.02
URINE CYTOLOGY: NORMAL
UROBILINOGEN URINE: 1 MG/DL
WHITE BLOOD CELLS URINE: 6 /HPF

## 2025-05-04 RX ORDER — AMOXICILLIN 500 MG/1
500 TABLET, FILM COATED ORAL
Qty: 14 | Refills: 0 | Status: ACTIVE | COMMUNITY
Start: 2025-05-04 | End: 1900-01-01

## 2025-05-05 ENCOUNTER — NON-APPOINTMENT (OUTPATIENT)
Age: 70
End: 2025-05-05

## 2025-05-06 ENCOUNTER — NON-APPOINTMENT (OUTPATIENT)
Age: 70
End: 2025-05-06

## 2025-05-06 ENCOUNTER — RX RENEWAL (OUTPATIENT)
Age: 70
End: 2025-05-06

## 2025-05-06 ENCOUNTER — OUTPATIENT (OUTPATIENT)
Dept: OUTPATIENT SERVICES | Facility: HOSPITAL | Age: 70
LOS: 1 days | End: 2025-05-06
Payer: MEDICARE

## 2025-05-06 ENCOUNTER — APPOINTMENT (OUTPATIENT)
Dept: NEUROLOGY | Facility: HOSPITAL | Age: 70
End: 2025-05-06

## 2025-05-06 VITALS
HEIGHT: 61 IN | SYSTOLIC BLOOD PRESSURE: 119 MMHG | HEART RATE: 69 BPM | WEIGHT: 202 LBS | DIASTOLIC BLOOD PRESSURE: 86 MMHG | BODY MASS INDEX: 38.14 KG/M2 | TEMPERATURE: 98 F | RESPIRATION RATE: 16 BRPM

## 2025-05-06 DIAGNOSIS — R51.9 HEADACHE, UNSPECIFIED: ICD-10-CM

## 2025-05-06 DIAGNOSIS — Z98.89 OTHER SPECIFIED POSTPROCEDURAL STATES: Chronic | ICD-10-CM

## 2025-05-06 DIAGNOSIS — Z98.51 TUBAL LIGATION STATUS: Chronic | ICD-10-CM

## 2025-05-06 DIAGNOSIS — D32.9 BENIGN NEOPLASM OF MENINGES, UNSPECIFIED: ICD-10-CM

## 2025-05-06 PROCEDURE — G0463: CPT

## 2025-05-06 RX ORDER — NAPROXEN 500 MG/1
500 TABLET ORAL
Qty: 30 | Refills: 0 | Status: ACTIVE | COMMUNITY
Start: 2025-04-29 | End: 1900-01-01

## 2025-05-12 ENCOUNTER — APPOINTMENT (OUTPATIENT)
Dept: MAMMOGRAPHY | Facility: CLINIC | Age: 70
End: 2025-05-12
Payer: MEDICARE

## 2025-05-12 ENCOUNTER — RESULT REVIEW (OUTPATIENT)
Age: 70
End: 2025-05-12

## 2025-05-12 PROCEDURE — 77063 BREAST TOMOSYNTHESIS BI: CPT

## 2025-05-12 PROCEDURE — 77067 SCR MAMMO BI INCL CAD: CPT

## 2025-05-16 ENCOUNTER — APPOINTMENT (OUTPATIENT)
Dept: UROLOGY | Facility: CLINIC | Age: 70
End: 2025-05-16
Payer: MEDICARE

## 2025-05-16 VITALS
DIASTOLIC BLOOD PRESSURE: 90 MMHG | HEIGHT: 61 IN | HEART RATE: 82 BPM | SYSTOLIC BLOOD PRESSURE: 143 MMHG | BODY MASS INDEX: 39.27 KG/M2 | OXYGEN SATURATION: 97 % | TEMPERATURE: 98.2 F | WEIGHT: 208 LBS

## 2025-05-16 DIAGNOSIS — R31.9 HEMATURIA, UNSPECIFIED: ICD-10-CM

## 2025-05-16 PROCEDURE — 52000 CYSTOURETHROSCOPY: CPT

## 2025-05-20 ENCOUNTER — RX RENEWAL (OUTPATIENT)
Age: 70
End: 2025-05-20

## 2025-06-02 ENCOUNTER — OUTPATIENT (OUTPATIENT)
Dept: OUTPATIENT SERVICES | Facility: HOSPITAL | Age: 70
LOS: 1 days | End: 2025-06-02

## 2025-06-02 ENCOUNTER — APPOINTMENT (OUTPATIENT)
Dept: NEUROSURGERY | Facility: HOSPITAL | Age: 70
End: 2025-06-02

## 2025-06-02 VITALS
BODY MASS INDEX: 38.89 KG/M2 | HEIGHT: 61 IN | TEMPERATURE: 97.9 F | HEART RATE: 64 BPM | SYSTOLIC BLOOD PRESSURE: 153 MMHG | WEIGHT: 206 LBS | DIASTOLIC BLOOD PRESSURE: 87 MMHG

## 2025-06-02 DIAGNOSIS — Z98.51 TUBAL LIGATION STATUS: Chronic | ICD-10-CM

## 2025-06-02 DIAGNOSIS — Z90.710 ACQUIRED ABSENCE OF BOTH CERVIX AND UTERUS: Chronic | ICD-10-CM

## 2025-06-02 DIAGNOSIS — Z98.89 OTHER SPECIFIED POSTPROCEDURAL STATES: Chronic | ICD-10-CM

## 2025-06-03 DIAGNOSIS — D49.6 NEOPLASM OF UNSPECIFIED BEHAVIOR OF BRAIN: ICD-10-CM

## 2025-06-05 ENCOUNTER — RX RENEWAL (OUTPATIENT)
Age: 70
End: 2025-06-05

## 2025-06-16 ENCOUNTER — APPOINTMENT (OUTPATIENT)
Dept: INTERNAL MEDICINE | Facility: CLINIC | Age: 70
End: 2025-06-16

## 2025-06-24 ENCOUNTER — NON-APPOINTMENT (OUTPATIENT)
Age: 70
End: 2025-06-24

## 2025-06-24 ENCOUNTER — APPOINTMENT (OUTPATIENT)
Dept: OPHTHALMOLOGY | Facility: CLINIC | Age: 70
End: 2025-06-24
Payer: MEDICARE

## 2025-06-24 PROCEDURE — 92014 COMPRE OPH EXAM EST PT 1/>: CPT | Mod: 25

## 2025-06-24 PROCEDURE — 92134 CPTRZ OPH DX IMG PST SGM RTA: CPT

## 2025-08-12 ENCOUNTER — APPOINTMENT (OUTPATIENT)
Dept: NEUROLOGY | Facility: HOSPITAL | Age: 70
End: 2025-08-12

## (undated) DEVICE — CLAMP BX HOT RAD JAW 3

## (undated) DEVICE — SOL INJ NS 0.9% 500ML 2 PORT

## (undated) DEVICE — PACK IV START WITH CHG

## (undated) DEVICE — SUCTION YANKAUER NO CONTROL VENT

## (undated) DEVICE — SYR LUER LOK 50CC

## (undated) DEVICE — FOLEY HOLDER STATLOCK 2 WAY ADULT

## (undated) DEVICE — POLY TRAP ETRAP

## (undated) DEVICE — CATH IV SAFE BC 22G X 1" (BLUE)

## (undated) DEVICE — TUBING SUCTION 20FT

## (undated) DEVICE — FORCEP RADIAL JAW 4 JUMBO 2.8MM 3.2MM 240CM ORANGE DISP

## (undated) DEVICE — IRRIGATOR BIO SHIELD

## (undated) DEVICE — SENSOR O2 FINGER ADULT

## (undated) DEVICE — ENDOCUFF VISION SZ 2 LG GRN

## (undated) DEVICE — TUBING IV SET GRAVITY 3Y 100" MACRO

## (undated) DEVICE — TUBING SUCTION CONN 6FT STERILE

## (undated) DEVICE — ELCTR GROUNDING PAD ADULT COVIDIEN

## (undated) DEVICE — SNARE EXACTO COLD 9MMX230CM

## (undated) DEVICE — BIOPSY FORCEP RADIAL JAW 4 STANDARD WITH NEEDLE

## (undated) DEVICE — CATH IV SAFE BC 20G X 1.16" (PINK)

## (undated) DEVICE — BRUSH COLONOSCOPY CYTOLOGY